# Patient Record
Sex: MALE | Race: WHITE
[De-identification: names, ages, dates, MRNs, and addresses within clinical notes are randomized per-mention and may not be internally consistent; named-entity substitution may affect disease eponyms.]

---

## 2019-07-02 ENCOUNTER — HOSPITAL ENCOUNTER (OUTPATIENT)
Dept: HOSPITAL 53 - M OPP | Age: 79
Discharge: HOME | End: 2019-07-02
Attending: SURGERY
Payer: MEDICARE

## 2019-07-02 VITALS — SYSTOLIC BLOOD PRESSURE: 134 MMHG | DIASTOLIC BLOOD PRESSURE: 84 MMHG

## 2019-07-02 VITALS — HEIGHT: 68 IN | WEIGHT: 179 LBS | BODY MASS INDEX: 27.13 KG/M2

## 2019-07-02 DIAGNOSIS — D12.5: ICD-10-CM

## 2019-07-02 DIAGNOSIS — Z86.010: ICD-10-CM

## 2019-07-02 DIAGNOSIS — Z80.0: ICD-10-CM

## 2019-07-02 DIAGNOSIS — K57.30: ICD-10-CM

## 2019-07-02 DIAGNOSIS — Z12.11: Primary | ICD-10-CM

## 2019-07-02 DIAGNOSIS — D12.2: ICD-10-CM

## 2019-07-02 DIAGNOSIS — D12.3: ICD-10-CM

## 2019-07-02 NOTE — ROOR
________________________________________________________________________________

Patient Name: Espinoza Childers             Procedure Date: 7/2/2019 7:29 AM

MRN: [id number]                          Account Number: [id number]

YOB: 1940                Age: 79

Room: [facility]                            Gender: Male

Note Status: Finalized                 

________________________________________________________________________________

 

Procedure:           Colonoscopy

Indications:         High risk colon cancer surveillance: Personal history of 

                     colonic polyps, Family history of colon cancer in a 

                     first-degree relative, Last colonoscopy: September 2015

Providers:           Nigel Quezada MD

Referring MD:        Johnna Emerson DO

Requesting Provider: 

Medicines:           Monitored Anesthesia Care

Complications:       No immediate complications.

________________________________________________________________________________

Procedure:           Pre-Anesthesia Assessment:

                     - Prior to the procedure, a History and Physical was 

                     performed, and patient medications and allergies were 

                     reviewed. The patient is competent. The risks and 

                     benefits of the procedure and the sedation options and 

                     risks were discussed with the patient. All questions were 

                     answered and informed consent was obtained. Patient 

                     identification and proposed procedure were verified by 

                     the physician, the nurse and the anesthesiologist. Mental 

                     Status Examination: alert and oriented. CV Examination: 

                     regular rate and rhythm. Prophylactic Antibiotics: The 

                     patient does not require prophylactic antibiotics. Prior 

                     Anticoagulants: The patient has taken no previous 

                     anticoagulant or antiplatelet agents. ASA Grade 

                     Assessment: II - A patient with mild systemic disease. 

                     After reviewing the risks and benefits, the patient was 

                     deemed in satisfactory condition to undergo the 

                     procedure. The anesthesia plan was to use monitored 

                     anesthesia care (MAC). Immediately prior to 

                     administration of medications, the patient was 

                     re-assessed for adequacy to receive sedatives. The heart 

                     rate, respiratory rate, oxygen saturations, blood 

                     pressure, adequacy of pulmonary ventilation, and response 

                     to care were monitored throughout the procedure. The 

                     physical status of the patient was re-assessed after the 

                     procedure.

                     The Colonoscope [id number] #[id number] was introduced through 

                     the anus and advanced to the cecum, identified by 

                     appendiceal orifice and ileocecal valve. The colonoscopy 

                     was performed without difficulty. The patient tolerated 

                     the procedure well. The quality of the bowel preparation 

                     was excellent.

                                                                                

Findings:

     The perianal and digital rectal examinations were normal.

     A 3 mm polyp was found in the proximal ascending colon. The polyp was 

     sessile. The polyp was removed with a jumbo cold forceps. Resection and 

     retrieval were complete. Estimated blood loss was minimal.

     A 5 mm polyp was found in the transverse colon. The polyp was 

     semi-pedunculated. The polyp was removed with a cold snare. Resection and 

     retrieval were complete. Estimated blood loss was minimal.

     A 3 mm polyp was found in the sigmoid colon. The polyp was sessile. The 

     polyp was removed with a jumbo cold forceps. Resection and retrieval were 

     complete. Estimated blood loss was minimal.

     Many medium-mouthed diverticula were found in the entire colon.

                                                                                

Impression:          - One 3 mm polyp in the proximal ascending colon, removed 

                     with a jumbo cold forceps. Resected and retrieved.

                     - One 5 mm polyp in the transverse colon, removed with a 

                     cold snare. Resected and retrieved.

                     - One 3 mm polyp in the sigmoid colon, removed with a 

                     jumbo cold forceps. Resected and retrieved.

                     - Diverticulosis in the entire examined colon.

Recommendation:      - Discharge patient to home.

                     - Resume previous diet.

                     - Continue present medications.

                     - Await pathology results.

                     - Return to nurse practitioner as previously scheduled.

                                                                                

 

Nigel Quezada MD

__________________

Nigel Quezada MD

7/2/2019 8:28:37 AM

Electronically signed by Nigel Quezada MD

Number of Addenda: 0

 

Note Initiated On: 7/2/2019 7:29 AM

Estimated Blood Loss:

     Estimated blood loss was minimal.

## 2020-11-04 ENCOUNTER — HOSPITAL ENCOUNTER (OUTPATIENT)
Dept: HOSPITAL 53 - M LAB | Age: 80
End: 2020-11-04
Attending: FAMILY MEDICINE
Payer: MEDICARE

## 2020-11-04 DIAGNOSIS — R10.9: Primary | ICD-10-CM

## 2020-11-04 LAB
BUN SERPL-MCNC: 23 MG/DL (ref 7–18)
CREAT SERPL-MCNC: 1.03 MG/DL (ref 0.7–1.3)
GFR SERPL CREATININE-BSD FRML MDRD: > 60 ML/MIN/{1.73_M2} (ref 35–?)

## 2020-11-04 PROCEDURE — 84520 ASSAY OF UREA NITROGEN: CPT

## 2020-11-04 PROCEDURE — 74177 CT ABD & PELVIS W/CONTRAST: CPT

## 2020-11-04 PROCEDURE — 82565 ASSAY OF CREATININE: CPT

## 2020-11-04 PROCEDURE — 36415 COLL VENOUS BLD VENIPUNCTURE: CPT

## 2020-11-04 NOTE — REP
INDICATION:

ABD BLOATING/PAIN,   LAB 1ST THEN FILE RM FOR CT.



COMPARISON:

None



TECHNIQUE:

100 cc Isovue 370.



FINDINGS:

Basilar fibrotic and or subsegmental atelectatic changes are seen in the lung bases.

There is a small left pleural effusion.  There is no evidence of a pericardial

effusion.



Two focal low densities are seen in the liver.  These are likely cysts.  The spleen

and pancreas are within normal limits.  The adrenal glands are within normal limits.

There are multiple bilateral renal cysts of various sizes.  The largest on the right

measures 8.9 cm and the largest on the left measures 5.6 cm.  The abdominal aorta and

para-aortic regions are within normal limits.



There is moderate ascites.  There is nodular enhancement of the omentum and mesentery.

There is omental caking in the lesser sac particularly the gastrohepatic ligament.

There is no evidence of intestinal obstruction.



Bone window technique throughout the exam shows spinal degenerative changes with left

hip and bilateral sacroiliac joint degenerative changes.  There is a right hip

prosthesis.



IMPRESSION:

1. 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

2. Bilateral renal cysts as described above.

3. Other findings as described above.





<Electronically signed by Tae Elias > 11/04/20 [id number]

## 2020-11-06 ENCOUNTER — HOSPITAL ENCOUNTER (OUTPATIENT)
Dept: HOSPITAL 53 - M LAB | Age: 80
End: 2020-11-06
Attending: FAMILY MEDICINE
Payer: MEDICARE

## 2020-11-06 DIAGNOSIS — R18.8: ICD-10-CM

## 2020-11-06 DIAGNOSIS — C79.9: ICD-10-CM

## 2020-11-06 DIAGNOSIS — D12.6: ICD-10-CM

## 2020-11-06 DIAGNOSIS — K76.89: ICD-10-CM

## 2020-11-06 DIAGNOSIS — R14.0: Primary | ICD-10-CM

## 2020-11-06 LAB
ALBUMIN SERPL BCG-MCNC: 2.6 GM/DL (ref 3.2–5.2)
ALT SERPL W P-5'-P-CCNC: 16 U/L (ref 12–78)
BASOPHILS # BLD AUTO: 0.1 10^3/UL (ref 0–0.2)
BASOPHILS NFR BLD AUTO: 0.5 % (ref 0–1)
BILIRUB SERPL-MCNC: 0.6 MG/DL (ref 0.2–1)
BUN SERPL-MCNC: 22 MG/DL (ref 7–18)
CALCIUM SERPL-MCNC: 9.4 MG/DL (ref 8.8–10.2)
CHLORIDE SERPL-SCNC: 98 MEQ/L (ref 98–107)
CO2 SERPL-SCNC: 33 MEQ/L (ref 21–32)
CREAT SERPL-MCNC: 1.12 MG/DL (ref 0.7–1.3)
EOSINOPHIL # BLD AUTO: 0 10^3/UL (ref 0–0.5)
EOSINOPHIL NFR BLD AUTO: 0.1 % (ref 0–3)
GFR SERPL CREATININE-BSD FRML MDRD: > 60 ML/MIN/{1.73_M2} (ref 35–?)
GLUCOSE SERPL-MCNC: 120 MG/DL (ref 70–100)
HCT VFR BLD AUTO: 46.3 % (ref 42–52)
HGB BLD-MCNC: 15.3 G/DL (ref 13.5–17.5)
LYMPHOCYTES # BLD AUTO: 0.7 10^3/UL (ref 1.5–5)
LYMPHOCYTES NFR BLD AUTO: 5 % (ref 24–44)
MCH RBC QN AUTO: 30.4 PG (ref 27–33)
MCHC RBC AUTO-ENTMCNC: 33 G/DL (ref 32–36.5)
MCV RBC AUTO: 91.9 FL (ref 80–96)
MONOCYTES # BLD AUTO: 1.1 10^3/UL (ref 0–0.8)
MONOCYTES NFR BLD AUTO: 7.9 % (ref 0–5)
NEUTROPHILS # BLD AUTO: 11.6 10^3/UL (ref 1.5–8.5)
NEUTROPHILS NFR BLD AUTO: 85.7 % (ref 36–66)
PLATELET # BLD AUTO: 450 10^3/UL (ref 150–450)
POTASSIUM SERPL-SCNC: 3.5 MEQ/L (ref 3.5–5.1)
PROT SERPL-MCNC: 6.5 GM/DL (ref 6.4–8.2)
RBC # BLD AUTO: 5.04 10^6/UL (ref 4.3–6.1)
SODIUM SERPL-SCNC: 139 MEQ/L (ref 136–145)
WBC # BLD AUTO: 13.5 10^3/UL (ref 4–10)

## 2020-11-12 ENCOUNTER — HOSPITAL ENCOUNTER (OUTPATIENT)
Dept: HOSPITAL 53 - M LABSMTC | Age: 80
End: 2020-11-12
Attending: ANESTHESIOLOGY
Payer: MEDICARE

## 2020-11-12 DIAGNOSIS — Z01.812: Primary | ICD-10-CM

## 2020-11-12 DIAGNOSIS — Z20.828: ICD-10-CM

## 2020-11-16 ENCOUNTER — HOSPITAL ENCOUNTER (OUTPATIENT)
Dept: HOSPITAL 53 - M IRPRO | Age: 80
End: 2020-11-16
Attending: SURGERY
Payer: MEDICARE

## 2020-11-16 VITALS — SYSTOLIC BLOOD PRESSURE: 126 MMHG | DIASTOLIC BLOOD PRESSURE: 74 MMHG

## 2020-11-16 DIAGNOSIS — Z79.01: ICD-10-CM

## 2020-11-16 DIAGNOSIS — R85.69: ICD-10-CM

## 2020-11-16 DIAGNOSIS — R18.8: Primary | ICD-10-CM

## 2020-11-16 LAB
APPEARANCE FLD: (no result)
COLOR PRT: YELLOW
INR PPP: 1.43
PROTHROMBIN TIME: 17.8 SECONDS (ref 12.5–14.3)
SPECIMEN SOURCE FLD: (no result)

## 2020-11-16 NOTE — REP
INDICATION:

OTHER ASCITES  PT HAVING LABS FIRST



COMPARISON:

None.



TECHNIQUE:

The procedure was performed by Terri Ramachandran [facility],  under the direct

supervision of Dr. Downs



The risks and benefits of the procedure were explained to the patient and an informed

consent was obtained both verbally and written.  Directly prior to the start of the

procedure a formal time-out was completed in the procedure room.



The largest pocket of fluid was localized in the left flank using ultrasound guidance.

The skin was prepped and draped in a sterile fashion.  Eleven ML of buffered lidocaine

was used as a local anesthetic. An 8-Greek multi side-hole catheter was inserted

using trocar technique.



FINDINGS:

5300 mL of dark yellow ascites fluid was withdrawn, 500 mL was sent to the lab for

further analysis, of the rest was discarded.



The patient tolerated the procedure well and there were no immediate complications.

After the appropriate amount of monitored convalescence, the patient was discharged

from the department.



IMPRESSION:

Ultrasound-guided paracentesis with removal of 5300 mL of ascites.





<Electronically signed by Terri Ramachandran > 11/16/20 [id number]

<Electronically signed by Humberto Downs > 11/16/20 [id number] Keep your intake of vitamin K regular. The highest amount of vitamin K is found in green and leafy vegetables like broccoli, lettuces, cabbage, and spinach. You can eat these foods but keep the portion size the same. Changes in the amount you eat can affect your PT/INR blood test. Contact your doctor before making any major changes in your diet. Limit your alcohol intake.

## 2020-11-17 ENCOUNTER — HOSPITAL ENCOUNTER (OUTPATIENT)
Dept: HOSPITAL 53 - M OPP | Age: 80
Discharge: HOME | End: 2020-11-17
Attending: SURGERY
Payer: MEDICARE

## 2020-11-17 VITALS — HEIGHT: 69 IN | BODY MASS INDEX: 27.4 KG/M2 | WEIGHT: 185 LBS

## 2020-11-17 VITALS — DIASTOLIC BLOOD PRESSURE: 61 MMHG | SYSTOLIC BLOOD PRESSURE: 100 MMHG

## 2020-11-17 DIAGNOSIS — I10: ICD-10-CM

## 2020-11-17 DIAGNOSIS — R18.8: ICD-10-CM

## 2020-11-17 DIAGNOSIS — K25.9: ICD-10-CM

## 2020-11-17 DIAGNOSIS — R93.3: ICD-10-CM

## 2020-11-17 DIAGNOSIS — K22.2: Primary | ICD-10-CM

## 2020-11-17 DIAGNOSIS — K31.89: ICD-10-CM

## 2020-11-17 DIAGNOSIS — Z87.891: ICD-10-CM

## 2020-11-17 NOTE — ROOR
________________________________________________________________________________

Patient Name: Espinoza Childers             Procedure Date: 11/17/2020 3:27 PM

MRN: [id number]                          Account Number: [id number]

YOB: 1940                Age: 80

Room: [facility]                            Gender: Male

Note Status: Finalized                 

________________________________________________________________________________

 

Procedure:            Upper GI endoscopy

Indications:          Abnormal CT of the GI tract, Patient has new ascites and 

                      CT shows nodular appearance of mesentery and omentum 

                      suggesting malignancy.

Providers:            Nigel Quezada MD

Referring MD:         Johnna Emerson DO

Requesting Provider:  

Medicines:            Monitored Anesthesia Care

Complications:        No immediate complications.

________________________________________________________________________________

Procedure:            Pre-Anesthesia Assessment:

                      - Prior to the procedure, a History and Physical was 

                      performed, and patient medications and allergies were 

                      reviewed. The patient is competent. The risks and 

                      benefits of the procedure and the sedation options and 

                      risks were discussed with the patient. All questions 

                      were answered and informed consent was obtained. Patient 

                      identification and proposed procedure were verified by 

                      the physician, the nurse and the anesthetist in the 

                      procedure room. Mental Status Examination: alert and 

                      oriented. CV Examination: regular rate and rhythm. 

                      Prophylactic Antibiotics: The patient does not require 

                      prophylactic antibiotics. Prior Anticoagulants: The 

                      patient has taken no previous anticoagulant or 

                      antiplatelet agents. ASA Grade Assessment: III - A 

                      patient with severe systemic disease. After reviewing 

                      the risks and benefits, the patient was deemed in 

                      satisfactory condition to undergo the procedure. The 

                      anesthesia plan was to use monitored anesthesia care 

                      (MAC). Immediately prior to administration of 

                      medications, the patient was re-assessed for adequacy to 

                      receive sedatives. The heart rate, respiratory rate, 

                      oxygen saturations, blood pressure, adequacy of 

                      pulmonary ventilation, and response to care were 

                      monitored throughout the procedure. The physical status 

                      of the patient was re-assessed after the procedure.

                      The Endoscope was introduced through the mouth, and 

                      advanced to the second part of duodenum. The upper GI 

                      endoscopy was accomplished without difficulty. The 

                      patient tolerated the procedure well.

                                                                                

Findings:

     One benign-appearing, intrinsic mild stenosis was found at the 

     gastroesophageal junction. The stenosis was traversed.

     Multiple dispersed, small non-bleeding erosions were found in the gastric 

     antrum and in the prepyloric region of the stomach. There were no 

     stigmata of recent bleeding.

     One non-obstructing non-bleeding cratered duodenal ulcer with pigmented 

     material was found in the first portion of the duodenum. The lesion was 

     20 mm in largest dimension. There is no evidence of perforation. Biopsies 

     were taken with a cold forceps for histology.

     The second portion of the duodenum was normal.

                                                                                

Impression:           - Benign-appearing esophageal stenosis.

                      - Non-bleeding erosive gastropathy.

                      - Non-obstructing non-bleeding duodenal ulcer with 

                      pigmented material. There is no evidence of perforation. 

                      Biopsied.

                      - Normal second portion of the duodenum.

Recommendation:       - Discharge patient to home.

                      - Resume previous diet.

                      - Continue present medications.

                      - Use Prilosec (omeprazole) 40 mg PO BID.

                      - Await pathology results.

                      - Return to my office in 1 week.

                                                                                

Procedure Code(s):    --- Professional ---

                      60195, Esophagogastroduodenoscopy, flexible, transoral; 

                      with biopsy, single or multiple

Diagnosis Code(s):    --- Professional ---

                      K22.2, Esophageal obstruction

                      K31.89, Other diseases of stomach and duodenum

                      K26.9, Duodenal ulcer, unspecified as acute or chronic, 

                      without hemorrhage or perforation

                      R93.3, Abnormal findings on diagnostic imaging of other 

                      parts of digestive tract

 

CPT copyright 2019 [employer]. All rights reserved.

 

The codes documented in this report are preliminary and upon  review may 

be revised to meet current compliance requirements.

 

Nigel Quezada MD

__________________

Nigel Quezada MD

11/17/2020 5:20:52 PM

Electronically signed by Nigel Quezada MD

Number of Addenda: 0

 

Note Initiated On: 11/17/2020 3:27 PM

Estimated Blood Loss: Estimated blood loss was minimal.

## 2020-11-19 ENCOUNTER — HOSPITAL ENCOUNTER (INPATIENT)
Dept: HOSPITAL 53 - M ED | Age: 80
LOS: 4 days | Discharge: HOME | DRG: 357 | End: 2020-11-23
Attending: INTERNAL MEDICINE | Admitting: INTERNAL MEDICINE
Payer: MEDICARE

## 2020-11-19 VITALS — BODY MASS INDEX: 28.8 KG/M2 | WEIGHT: 194.45 LBS | HEIGHT: 69 IN

## 2020-11-19 VITALS — DIASTOLIC BLOOD PRESSURE: 80 MMHG | SYSTOLIC BLOOD PRESSURE: 130 MMHG

## 2020-11-19 DIAGNOSIS — Z98.42: ICD-10-CM

## 2020-11-19 DIAGNOSIS — I27.82: ICD-10-CM

## 2020-11-19 DIAGNOSIS — J91.0: ICD-10-CM

## 2020-11-19 DIAGNOSIS — Z79.899: ICD-10-CM

## 2020-11-19 DIAGNOSIS — R18.8: ICD-10-CM

## 2020-11-19 DIAGNOSIS — K56.7: Primary | ICD-10-CM

## 2020-11-19 DIAGNOSIS — Z96.641: ICD-10-CM

## 2020-11-19 DIAGNOSIS — K22.2: ICD-10-CM

## 2020-11-19 DIAGNOSIS — E78.00: ICD-10-CM

## 2020-11-19 DIAGNOSIS — E86.0: ICD-10-CM

## 2020-11-19 DIAGNOSIS — Z79.01: ICD-10-CM

## 2020-11-19 DIAGNOSIS — C78.6: ICD-10-CM

## 2020-11-19 DIAGNOSIS — K31.9: ICD-10-CM

## 2020-11-19 DIAGNOSIS — R00.0: ICD-10-CM

## 2020-11-19 DIAGNOSIS — Z87.891: ICD-10-CM

## 2020-11-19 DIAGNOSIS — Z98.41: ICD-10-CM

## 2020-11-19 DIAGNOSIS — K26.9: ICD-10-CM

## 2020-11-19 DIAGNOSIS — Z66: ICD-10-CM

## 2020-11-19 LAB
ALBUMIN SERPL BCG-MCNC: 1.8 GM/DL (ref 3.2–5.2)
ALT SERPL W P-5'-P-CCNC: 15 U/L (ref 12–78)
APTT BLD: 33.5 SECONDS (ref 24.2–38.5)
BASOPHILS # BLD AUTO: 0 10^3/UL (ref 0–0.2)
BASOPHILS NFR BLD AUTO: 0.2 % (ref 0–1)
BILIRUB CONJ SERPL-MCNC: 0.1 MG/DL (ref 0–0.2)
BILIRUB SERPL-MCNC: 0.3 MG/DL (ref 0.2–1)
BUN SERPL-MCNC: 34 MG/DL (ref 7–18)
CALCIUM SERPL-MCNC: 8 MG/DL (ref 8.8–10.2)
CHLORIDE SERPL-SCNC: 104 MEQ/L (ref 98–107)
CK MB CFR.DF SERPL CALC: 4.29
CK MB SERPL-MCNC: 1.8 NG/ML (ref ?–3.6)
CK SERPL-CCNC: 42 U/L (ref 39–308)
CO2 SERPL-SCNC: 28 MEQ/L (ref 21–32)
CREAT SERPL-MCNC: 1.21 MG/DL (ref 0.7–1.3)
EOSINOPHIL # BLD AUTO: 0 10^3/UL (ref 0–0.5)
EOSINOPHIL NFR BLD AUTO: 0.2 % (ref 0–3)
GFR SERPL CREATININE-BSD FRML MDRD: > 60 ML/MIN/{1.73_M2} (ref 35–?)
GLUCOSE SERPL-MCNC: 104 MG/DL (ref 70–100)
HCT VFR BLD AUTO: 37.6 % (ref 42–52)
HGB BLD-MCNC: 12.4 G/DL (ref 13.5–17.5)
INR PPP: 1.42
LIPASE SERPL-CCNC: 121 U/L (ref 73–393)
LYMPHOCYTES # BLD AUTO: 0.6 10^3/UL (ref 1.5–5)
LYMPHOCYTES NFR BLD AUTO: 5.7 % (ref 24–44)
MCH RBC QN AUTO: 29.5 PG (ref 27–33)
MCHC RBC AUTO-ENTMCNC: 33 G/DL (ref 32–36.5)
MCV RBC AUTO: 89.5 FL (ref 80–96)
MONOCYTES # BLD AUTO: 0.8 10^3/UL (ref 0–0.8)
MONOCYTES NFR BLD AUTO: 8.3 % (ref 0–5)
NEUTROPHILS # BLD AUTO: 8.6 10^3/UL (ref 1.5–8.5)
NEUTROPHILS NFR BLD AUTO: 84.9 % (ref 36–66)
PLATELET # BLD AUTO: 540 10^3/UL (ref 150–450)
POTASSIUM SERPL-SCNC: 4.1 MEQ/L (ref 3.5–5.1)
PROT SERPL-MCNC: 5.6 GM/DL (ref 6.4–8.2)
PROTHROMBIN TIME: 17.7 SECONDS (ref 12.5–14.3)
RBC # BLD AUTO: 4.2 10^6/UL (ref 4.3–6.1)
SODIUM SERPL-SCNC: 139 MEQ/L (ref 136–145)
TROPONIN I SERPL-MCNC: 0.03 NG/ML (ref ?–0.1)
WBC # BLD AUTO: 10.1 10^3/UL (ref 4–10)

## 2020-11-19 RX ADMIN — OMEPRAZOLE SCH MG: 20 CAPSULE, DELAYED RELEASE ORAL at 23:28

## 2020-11-19 RX ADMIN — DEXTROSE AND SODIUM CHLORIDE SCH MLS/HR: 5; 900 INJECTION, SOLUTION INTRAVENOUS at 23:04

## 2020-11-19 RX ADMIN — RAMELTEON SCH MG: 8 TABLET ORAL at 21:00

## 2020-11-19 NOTE — REP
INDICATION:

r/o pneumonia.



COMPARISON:

None.



TECHNIQUE:

CT angiogram chest performed following the intravenous administration of 100 cc of

Isovue 370.  Sagittal and coronal reconstruction images are performed.



FINDINGS:

Lungs: There is patchy atelectasis or infiltrate inferiorly in the left lung.

Mediastinum: There is an enlarged left carinal lymph node measuring 1.2 cm in short

axis dimension.  There are few adjacent subcentimeter mediastinal lymph nodes.  There

are several subcentimeter lymph nodes in the cardiophrenic angles.  There is a

enlarged lymph node the right lateral cardiophrenic angle along the diaphragm which

measures 2.0 x 1.1 cm.  Few subcentimeter lymph nodes are seen anterior to the distal

esophagus.

Pulmonary arteries: In the posterior basilar pulmonary artery segment supplying the

right lower lobe there are partially occlusive pulmonary emboli which are likely not

acute.

Eufemia: No adenopathy.

Axilla: No adenopathy.

Pleura: There is a large left pleural effusion.

Heart: Not enlarged.

Thoracic aorta: No aneurysm or dissection.



Visualized osseous structures: There are degenerative changes of the spine without

compression deformity.



IMPRESSION:

There is patchy atelectasis or infiltrate inferiorly in the left lung.  There is a

mildly enlarged left carinal lymph node.  There are multiple subcentimeter lymph nodes

in the cardiophrenic angles and anterior to the distal esophagus.  There is an

enlarged right lateral cardiophrenic lymph node.



Partially occlusive pulmonary emboli in the secondary branch of the right pulmonary

artery supplying the posterior basilar segment of the right lower lobe.  I suspect

these are most likely chronic.



Large left pleural effusion.





<Electronically signed by Humberto Downs > 11/19/20 5200

## 2020-11-19 NOTE — REP
INDICATION:

gen abd pain.



COMPARISON:

11/04/2020



TECHNIQUE:

100 cc Isovue 370.  No oral bowel preparatory contrast was administered.



FINDINGS:

Please see CT angio chest report made same day in regards to findings involving the

lung bases.



There is a moderate amount of ascites.  There is abnormal soft tissue density and

enhancement involving the omentum consistent with omental caking.  There are tiny

nodular density seen along the peritoneum likely secondary to peritoneal seeding.  The

liver and gallbladder are within normal limits.  Incidental note is made of a small

hepatic cysts and a tiny hepatic calcification.  The spleen is within normal limits.

There are multiple bilateral renal cysts.  The pancreas is unchanged.  Small lymph

nodes are seen in the para-aortic region.  There is no evidence of free

intraperitoneal air.  Multiple gas and fluid-filled bowel loops are seen in the

abdomen.  There is gas and stool in the rectosigmoid region.



There is no significant change in appearance of the osseous structures.



IMPRESSION:

1. There is ascites which has decreased slightly in size compared to the prior exam.

2. There is evidence of peritoneal and omental metastatic disease.

3. There is evidence of an ileus versus partial small bowel obstruction.  This

represents a change from the prior exam.

4. Bilateral renal cysts are again noted.

5. Other findings as described above.





<Electronically signed by Tae Elias > 11/19/20 [id number]

## 2020-11-19 NOTE — HPEPDOC
[facility] Medical History & Physical


Date of Admission


Nov 19, 2020


Date of Service:  Nov 19, 2020


Primary Care Physician:  Johnna Emerson


Attending Physician:  NICKOLAS RICHARDS MD





History and Physical


CHIEF COMPLAINT:


Generalized abdominal pain





HISTORY OF PRESENT ILLNESS: 


Patient is an 80-year-old male, past medical history significant for 

hypertension and recently diagnosed metastatic carcinoma of unknown primary 

source. Patient shares that he was experiencing abdominal pain and distention 

earlier this month. A CT of the abdomen was performed by his primary care 

provider which indicated ascites and the stigmata of metastatic disease. He was 

subsequently referred to general surgery for further evaluation of the ascites. 

Paracentesis on 11/16/20 performed by Dr. Quezada with the removal of 5300 mL of

ascites. Ascitic fluid contained 1051 WBCs with high percentage of mononuclear 

and polymorphonuclear cells. Cytology report indicating atypical cells favoring 

malignancy. Patient reported noting significant improvement in his abdominal 

discomfort following the procedure.





He also recently underwent upper endoscopy on 11/17/20, also performed by Dr. Quezada. Findings indicate benign-appearing is soft  stenosis, nonbleeding 

erosive gastropathy, nonobstructing nonbleeding duodenal ulcer with pigmented 

material without evidence of perforation. Pathology on the pigmented material 

shows duodenal mucosa is extensive ulceration and reparative changes, negative 

for malignancy. A shunt was well in the perioperative period and routinely 

discharged home.





Patient presented to the emergency department the evening of 11/19/20. EMS. 

Patient reported increasing abdominal discomfort and distention that was 

becoming intolerable. He reports this pain is similar to what he experienced 

prior to his paracentesis. He also reported some increasing shortness of breath,

particularly while lying down. Denied any chest pain or tightness.





In the emergency department, patient was found to be afebrile, tachycardic, 

normotensive maintaining saturation 95% on room air. Laboratory evaluation 

showed a WBC of 10.1, H/H of 12.4/37.6. Mild thrombocytosis of 540. Electrolytes

within normal limits, BUN/Cr of 34/1.21. Hypoalbuminemia of 1.8. Troponin of 

0.03 without any signs of ischemia on EKG. Given patient's abdominal pain and 

distention, plain film was ordered showing signs of possible small bowel 

obstruction. Abdomen/pelvis CT confirmed the finding. CT angiography of the 

patient's chest indicates a chronic pulmonary embolism, likely related to 

paraneoplastic hypercoagulability. Emergency department contacted on-call 

surgeon who happened to be Dr. Quezada. He recommended not placing an NG tube, 

keeping the patient nothing by mouth with IVF hydration and monitoring. 





Given the above findings, in the setting of newly diagnosed metastatic disease, 

the hospice team was contacted to admit the patient for further evaluation and 

management.





PAST MEDICAL HISTORY:


Hypertension


Right hip osteoarthritis


Metastatic carcinoma, unknown primary


Esophageal stricture





PAST SURGICAL HISTORY:


Polypectomy, 2009, 2012, 2015


Bilateral cataracts, 2014


Right total hip replacement, 2015


Bilateral carpal tunnel release, 2015





SOCIAL HISTORY:


Marital status: 


Resides in: Lives in [place] with his wife. 


Employment: Retired private-


Tobacco use: Current nonsmoker, patient reports intermittently smoking pipes and

cigars but has not done so for 30 years.


ETOH: Denies any current alcohol use


Illicit drug use: Denies any current illicit or IV drug use





FAMILY HISTORY:


Father: Lung cancer


Mother: Colon cancer


Denies any family history of breast or prostate cancer





ALLERGIES: 


Please see below.





REVIEW OF SYSTEMS:


CONSTITUTIONAL: Denies any fevers, chills. Denies night sweats and fatigue. 

Reports decreased appetite. 


HEENT: Denies any headache, changes in vision, changes in hearing, tinnitus, 

rhinorrhea or nasal congestion, sore throat or difficulty swallowing


CARDIOVASCULAR: Denies any chest pain, pressure, palpitations


RESPIRATORY: Reports intermittent dry cough


GASTROINTESTINAL: Reports generalized periumbilical abdominal fullness and 

discomfort, resolved status post morphine. Reports a one-month history of loose 

stools without any kathryn blood or melena.


GENITOURINARY: Has any difficulty urinating


SKIN: No new rashes or lesions, denies any recent bruising


MUSCULOSKELETAL: Has any muscle aches or pains


NEUROLOGICAL: Denies any focal neurologic deficits, weakness, changes in 

sensorium





HOME MEDICATIONS: 


Please see below. 





PHYSICAL EXAMINATION:


VITAL SIGNS: See below


GENERAL APPEARANCE: Patient is interviewed and examined in the emergency 

department. Patient was found to be resting comfortably on his left side on the 

hospital stretcher. He was easily arousable, awake, alert, no acute distress. He

is able to answer questions regarding his medical history without trouble. 

Patient appears his stated age, good hygiene.


HEENT: Normocephalic, atraumatic, symmetric, EOMI, PERRLA, mucous membranes 

moist, fair oral hygiene


CARDIOVASCULAR: Regular rate and rhythm without appreciable murmur


LUNGS: Lung sounds diminished in the bases bilaterally, more so on the left than

the right. Left-sided basilar crackles. Otherwise good air movement with 

symmetric chest rise. No appreciable wheezing or rhonchi


ABDOMEN: Distended, round, mild tenderness in the lower quadrants bilaterally, 

bowel sounds appreciated. No overlying skin changes


MUSCULOSKELETAL: Patient is able to move all extremities equally bilaterally.


EXTREMITIES: Trace edema in the lower extremity is bilaterally. No calf 

tenderness, no unilateral findings including erythema, swelling, temperature 

changes.


NEUROLOGICAL: Strength 5 out of 5 in upper and lower extremities. No facial 

droop, aphasia or dysarthria. Sensorium intact


PSYCHIATRIC: Mood and affect are appropriate given patient's current medical 

conditions.





LABORATORY DATA: 


See below.





IMAGING: 


Paracentesis U/S (11/16/20): Ultrasound-guided paracentesis with removal of 5300

mL of ascites.


Chest CT Angio (11/19/20): There is patchy atelectasis or infiltrate inferiorly 

in the left lung. There is a mildly enlarged left carinal lymph node. There are 

multiple subcentimeter lymph nodes in the cardiophrenic angles and anterior to 

the distal esophagus. There is an enlarged right lateral cardiophrenic lymph 

node. Partially occlusive pulmonary emboli and a secondary branch of the right 

pulmonary artery supplying the posterior basal segment of the right lower lobe. 

I suspect these are most likely chronic. Large left pleural effusion.


Abdomen/Pelvis CT (11/19/20): There is ascites which has decreased slightly in 

size compared to prior exam. There is evidence of peritoneal and omental 

metastatic disease. There is evidence of an ileus versus partial small bowel 

obstruction. This represented change from the prior exam. Bilateral renal cysts 

are again noted. 


Abdomen XR (11/19/20): Partial small bowel obstruction versus ileus correlate 

clinically. Left pleural effusion and left basilar opacity. Concomitant 

pneumonia/atelectasis. 





ASSESSMENT: 


Patient is an 80-year-old male, past medical history significant for recently 

diagnosed metastatic disease of unknown primary source, presented to the 

emergency department the evening of 11/19/24 increasing abdominal pain and 

discomfort. Earlier in the week, patient underwent paracentesis with removal of 

over 500 mL of fluid. Imaging in the emergency department demonstrated a 

possible small bowel obstruction. Surgery was contacted who recommended 

refraining from NG tube placement, keeping the patient nothing by mouth with 

adequate IVF. Chronic right basilar PE and left-sided pleural effusion also 

noted on CT angiography of the patient's chest. Effusion, ascites and PE are 

most likely related to patient's underlying malignancy. Given these findings, in

the setting of metastatic disease, patient was admitted to the hospitalist 

service for further evaluation and management.





PLAN:


#Chronic PE, right posterior basilar segment, partially occlusive, 


-Denies any history of hemoptysis, dark tarry stools or hematochezia.


-Start patient on Eliquis 10 mg BID for 7 days, 5 mg BID for maintenance 

thereafter. 


-Monitor for bleeding and bruising. 





#Abdominal pain, 2/2 partial SBO, metastatic disease


-Dr. Quezada, who is familiar with the patient, consulted by ED physician. 


-NPO, IVF, KUB in am, NO NG tube per surgery. 


-Morphine 2 mg for pain/discomfort. 


-Consider serial plain films for monitoring


-Day team to consult surgery in am if not resolving. 





#Large left Pleural Effusion


-Multiple enlarged lymph nodes, likely malignant. 


-Remote pulse ox, O2 to maintain saturation above 92%





#Omental/Peritoneal Mets, unknown primary


-Paracentesis on 11/16/20, s/p removal of 


-Atypical cells, WBC of 100,00, likely malignant cytology


-CT today ascites slightly improved. 





#Tachycardia


-Trop negative, EKG RBBB, 104 on assessment. 


-Suspect dehydration/poor oral intake. IVF as above.


-Telemetry monitoring





#Hypertension


-Continue home lisinopril/hctz


-Normotensive





#Esophageal stricture


-S/P endoscopic evaluation on 11/17 by Dr. Quezada. 


-Continue home PPI





#Deconditioning


Consider discussion regarding palliative care








DVT PROPHYLAXIS:


Patient will be receiving Eliquis for his chronic PE





CODE STATUS:


Patient confirms DNR/trial of intubation





DISPOSITION:


Anticipate >2 night stays





Vital Signs





Vital Signs








  Date Time  Temp Pulse Resp B/P (MAP) Pulse Ox O2 Delivery O2 Flow Rate FiO2


 


11/19/20 19:17  104 16  90 Room Air  


 


11/19/20 19:15    119/59 (79)    


 


11/19/20 14:20 99.0       











Laboratory Data


Labs 24H


Laboratory Tests 2


11/19/20 15:16: 


Immature Granulocyte % (Auto) 0.7, Neutrophils (%) (Auto) 84.9H, Lymphocytes (%)

(Auto) 5.7L, Monocytes (%) (Auto) 8.3H, Eosinophils (%) (Auto) 0.2, Basophils 

(%) (Auto) 0.2, Neutrophils # (Auto) 8.6H, Lymphocytes # (Auto) 0.6L, Monocytes 

# (Auto) 0.8, Eosinophils # (Auto) 0.0, Basophils # (Auto) 0.0, Nucleated Red 

Blood Cells % (auto) 0.0, Prothrombin Time 17.7H, Prothromb Time International 

Ratio 1.42, Activated Partial Thromboplast Time 33.5, Anion Gap 7L, Glomerular 

Filtration Rate > 60.0, Calcium Level 8.0L, Total Bilirubin 0.3, Direct 

Bilirubin 0.1, Aspartate Amino Transf (AST/SGOT) 32, Alanine Aminotransferase 

(ALT/SGPT) 15, Alkaline Phosphatase 64, Total Creatine Kinase 42, Creatine Kinas

e MB 1.8, Creatine Kinase MB Relative Index 4.29H, Troponin I 0.03, Total 

Protein 5.6L, Albumin 1.8L, Albumin/Globulin Ratio 0.5, Lipase 121


11/19/20 18:41: Coronavirus (COVID-19)(PCR) NEGATIVE


CBC/BMP


Laboratory Tests


11/19/20 15:16











Home Medications


Scheduled


Apixaban (Eliquis) 5 Mg Tablet, 1 TAB PO BID


Ascorbic Acid (Ascorbic Acid) 500 Mg Tablet, 1,000 MG PO DAILY


Cholecalciferol (Vitamin D3) (Vitamin D3) 1,000 Unit Tablet, 1,000 UNITS PO 

DAILY


Multivitamins (Thera M Plus Tablet) 1 Each Tablet, 1 TAB PO DAILY


Omeprazole (Omeprazole) 40 Mg Capsule.dr, 40 MG PO BID





Scheduled PRN


Acetaminophen (Acetaminophen ER) 650 Mg Tablet.er, 650 MG PO Q8H PRN for PAIN





Allergies


Coded Allergies:  


     No Known Allergies (Unverified , 11/12/20)





A-FIB/CHADSVASC


A-FIB History


Current/History of A-Fib/PAF?:  No





GME ATTESTATION


GME ATTESTATION


My faculty preceptor for this patient encounter was physically present during 

the encounter and was fully available. All aspects of the patient interview, 

examination, medical decision making process, and medical care plan development 

were reviewed and approved by the faculty preceptor. The faculty preceptor is 

aware and concurs with the plan as stated in the body of this note and will 

attest to such by his/her cosignature.





ATTENDING NOTE


TIME OF SERVICE 915PM





Mr. Childers  is an 80 yr old w a hx of HTN, and newly diagnosed metastatic abd CA 

w ascites who presented w abdominal pain and will be admitted for management of 

possible SBO vs ileus. 


Plan: will ask the day time team to consider consider IR consult for therapeutic

paracentesis and pigtail and to f/u with  on possible SBO





rest per 's H&P











CAROLYNN CAI DO                Nov 19, 2020 20:52


NICKOLAS RICHARDS MD                Nov 20, 2020 06:23

## 2020-11-19 NOTE — REP
INDICATION:

abd pain.



COMPARISON:

Two view chest of 02/21/2015 the latest prior



FINDINGS:



Multiple gas-filled dilated small bowel loops are seen in the abdomen. The upright

view shows air-fluid levels within multiple small bowel loops.  There is some gas in

the rectosigmoid region.



The accompanying frontal view the chest shows a new left pleural effusion and patchy

left basilar opacity. There is no evidence of free subdiaphragmatic air.



IMPRESSION:

1. Partial small bowel obstruction versus ileus correlate clinically.

2. Left pleural effusion and left basilar opacity.  Concomitant pneumonia/atelectasis.





<Electronically signed by Tae Elias > 11/19/20 1444

## 2020-11-20 VITALS — SYSTOLIC BLOOD PRESSURE: 106 MMHG | DIASTOLIC BLOOD PRESSURE: 56 MMHG

## 2020-11-20 VITALS — DIASTOLIC BLOOD PRESSURE: 60 MMHG | SYSTOLIC BLOOD PRESSURE: 103 MMHG

## 2020-11-20 VITALS — SYSTOLIC BLOOD PRESSURE: 125 MMHG | DIASTOLIC BLOOD PRESSURE: 67 MMHG

## 2020-11-20 VITALS — SYSTOLIC BLOOD PRESSURE: 98 MMHG | DIASTOLIC BLOOD PRESSURE: 53 MMHG

## 2020-11-20 VITALS — DIASTOLIC BLOOD PRESSURE: 71 MMHG | SYSTOLIC BLOOD PRESSURE: 129 MMHG

## 2020-11-20 LAB
ALBUMIN SERPL BCG-MCNC: 1.5 GM/DL (ref 3.2–5.2)
ALT SERPL W P-5'-P-CCNC: 13 U/L (ref 12–78)
BILIRUB SERPL-MCNC: 0.2 MG/DL (ref 0.2–1)
BUN SERPL-MCNC: 28 MG/DL (ref 7–18)
CALCIUM SERPL-MCNC: 7.6 MG/DL (ref 8.8–10.2)
CANCER AG19-9 SERPL-ACNC: 2.9 U/ML (ref ?–35)
CHLORIDE SERPL-SCNC: 105 MEQ/L (ref 98–107)
CO2 SERPL-SCNC: 31 MEQ/L (ref 21–32)
CREAT SERPL-MCNC: 1.29 MG/DL (ref 0.7–1.3)
GFR SERPL CREATININE-BSD FRML MDRD: 56.8 ML/MIN/{1.73_M2} (ref 35–?)
GLUCOSE SERPL-MCNC: 94 MG/DL (ref 70–100)
MAGNESIUM SERPL-MCNC: 1.3 MG/DL (ref 1.8–2.4)
POTASSIUM SERPL-SCNC: 3.9 MEQ/L (ref 3.5–5.1)
PROT SERPL-MCNC: 5.5 GM/DL (ref 6.4–8.2)
SODIUM SERPL-SCNC: 139 MEQ/L (ref 136–145)

## 2020-11-20 RX ADMIN — MORPHINE SULFATE PRN MG: 2 INJECTION, SOLUTION INTRAMUSCULAR; INTRAVENOUS at 04:34

## 2020-11-20 RX ADMIN — MAGNESIUM SULFATE IN DEXTROSE SCH MLS/HR: 10 INJECTION, SOLUTION INTRAVENOUS at 08:23

## 2020-11-20 RX ADMIN — ASPIRIN SCH MG: 81 TABLET ORAL at 08:26

## 2020-11-20 RX ADMIN — RAMELTEON SCH MG: 8 TABLET ORAL at 21:52

## 2020-11-20 RX ADMIN — MAGNESIUM SULFATE IN DEXTROSE SCH MLS/HR: 10 INJECTION, SOLUTION INTRAVENOUS at 10:06

## 2020-11-20 RX ADMIN — SODIUM CHLORIDE, PRESERVATIVE FREE SCH ML: 5 INJECTION INTRAVENOUS at 21:53

## 2020-11-20 RX ADMIN — ENOXAPARIN SODIUM SCH MG: 80 INJECTION SUBCUTANEOUS at 21:53

## 2020-11-20 RX ADMIN — OMEPRAZOLE SCH MG: 20 CAPSULE, DELAYED RELEASE ORAL at 08:27

## 2020-11-20 RX ADMIN — DEXTROSE AND SODIUM CHLORIDE SCH MLS/HR: 5; 900 INJECTION, SOLUTION INTRAVENOUS at 08:23

## 2020-11-20 RX ADMIN — DEXTROSE MONOHYDRATE SCH MG: 50 INJECTION, SOLUTION INTRAVENOUS at 21:52

## 2020-11-20 NOTE — ECGEPIP
[facility] - ED

                                       

                                       Test Date:    2020

Pat Name:     CAROLINE JARVIS             Department:   

Patient ID:   [id number]                 Room:         [place] [address]

Gender:       Male                     Technician:   VICKI

:          1940               Requested By: REBECA BOYLE

Order Number: [id number]     Reading MD:   Lilliam Aquino

                                 Measurements

Intervals                              Axis          

Rate:         109                      P:            1

NV:           133                      QRS:          14

QRSD:         128                      T:            -27

QT:           347                                    

QTc:          469                                    

                           Interpretive Statements

SINUS TACHYCARDIA

RIGHT BUNDLE BRANCH BLOCK

SIMILAR 20

Electronically Signed on 2020 21:31:40 EST by Lilliam Aquino

## 2020-11-20 NOTE — IPNPDOC
Text Note


Date of Service


The patient was seen on 11/20/20.





NOTE


SUBJECTIVE: Complains of abdominal distension causing some discomfort and di

fficulty in breathing. Poor appetite. No bowel movement. Reports Passing flatus.







VITAL SIGNS: See below


GENERAL APPEARANCE: Laying flat in bed awake, alert, no acute distress. 


HEENT: Normocephalic, atraumatic, symmetric, EOMI, PERRLA, mucous membranes 

moist.


CARDIOVASCULAR: Regular rate and rhythm without appreciable murmur/ rub or 

gallop


LUNGS: Lung sounds diminished in the bases bilaterally, more so on the left than

the right. Left-sided basilar crackles. Otherwise good air movement with sy

mmetric chest rise. No appreciable wheezing or rhonchi


ABDOMEN: Distended, round, mild tenderness in the lower quadrants bilaterally, 

bowel sounds appreciated. No overlying skin changes


MUSCULOSKELETAL: Patient is able to move all extremities equally bilaterally


EXTREMITIES: No edema No calf tenderness


NEUROLOGICAL: Strength 5 out of 5 in upper and lower extremities. No facial 

droop, aphasia or dysarthria. Sensorium intact


PSYCHIATRIC: Mood and affect are appropriate given patient's current medical 

conditions.





LABORATORY DATA and Radiology : Reviewed.





IMAGING: 


Paracentesis U/S (11/16/20): Ultrasound-guided paracentesis with removal of 5300

mL of ascites.


Chest CT Angio (11/19/20): There is patchy atelectasis or infiltrate inferiorly 

in the left lung. There is a mildly enlarged left carinal lymph node. There are 

multiple subcentimeter lymph nodes in the cardiophrenic angles and anterior to 

the distal esophagus. There is an enlarged right lateral cardiophrenic lymph 

node. Partially occlusive pulmonary emboli and a secondary branch of the right 

pulmonary artery supplying the posterior basal segment of the right lower lobe. 

I suspect these are most likely chronic. Large left pleural effusion.


Abdomen/Pelvis CT (11/19/20): There is ascites which has decreased slightly in 

size compared to prior exam. There is evidence of peritoneal and omental 

metastatic disease. There is evidence of an ileus versus partial small bowel 

obstruction. This represented change from the prior exam. Bilateral renal cysts 

are again noted. 


Abdomen XR (11/19/20): Partial small bowel obstruction versus ileus correlate 

clinically. Left pleural effusion and left basilar opacity. Concomitant 

pneumonia/atelectasis. 





ASSESSMENT and Plan: 


Patient is an 80-year-old male, past medical history significant for recently 

diagnosed metastatic disease of unknown primary source with omental caking and 

mesenteric deposits with Ascites. He presented to the emergency department the 

evening of 11/19/24 increasing abdominal pain and discomfort. Earlier in the 

week, patient underwent paracentesis with removal of over 5000 mL of fluid. He 

had an EGD also earlier this which showed benign esophageal stricture, Had 

Colonoscopy in 2019 had multiple tubular adenomas.  Imaging in the emergency 

department demonstrated a possible small bowel obstruction vs Ileus. Surgery was

contacted who recommended refraining from NG tube placement, keeping the patient

nothing by mouth with adequate IVF. CT angio of chest showed chronic right 

basilar Pulmonary Embolism and left-sided pleural effusion moderate. Effusion, 

ascites and PE are most likely related to patient's underlying malignancy. Given

these findings, in the setting of metastatic disease, patient was admitted to 

the hospitalist service for further evaluation and management.





Partial SBO Vs ileus


NPO, IVF, Dr Quezada consulted





Chronic Pulmonary Embolism, right posterior basilar segment, partially 

occlusive, 


will need Eliquis but will hold off as may need procedures this admission


will give Lovenox instead 





Metastatic cancer with Omental/Peritoneal Mets, unknown primary


There were malignant cells in the ascites however immunohistochemistry could not

make a definitive diagnosis from the cells due to the paucity of the cells


Discussed with Dr Mittal will order PSA and Ca 19-9


May need mesenteric lymph node / omental bx to get a tissue diagnosis. 





Hypertension


Lisinopril reduced dose


will top HCTZ. 





Esophageal stenosis 


S/P endoscopic evaluation on 11/17 by Dr. Quezada. 


Biopsy did not show any malignancy


Continue PPI





Duodenal ulcer and erossive gastropathy seen in EGD on 11/17/20


Duodenal mucosa with extensive ulceration and reparative changes.


Negative for malignancy.


PPI





DVT PROPHYLAXIS:


Lovenox





CODE STATUS:


Patient confirms DNR/trial of intubation





VS,Fishbone, I+O


VS, Fishbone, I+O


Laboratory Tests


11/19/20 15:16








11/20/20 04:59











Vital Signs








  Date Time  Temp Pulse Resp B/P (MAP) Pulse Ox O2 Delivery O2 Flow Rate FiO2


 


11/20/20 12:00 98.0 100 18 129/71 (90) 99 Room Air  


 


11/20/20 12:00       2.0 














I&O- Last 24 Hours up to 6 AM 


 


 11/20/20





 05:59


 


Intake Total 50 ml


 


Output Total 350 ml


 


Balance -300 ml

















FLORIAN ROBLES MD                   Nov 20, 2020 13:26

## 2020-11-21 VITALS — DIASTOLIC BLOOD PRESSURE: 63 MMHG | SYSTOLIC BLOOD PRESSURE: 103 MMHG

## 2020-11-21 VITALS — SYSTOLIC BLOOD PRESSURE: 120 MMHG | DIASTOLIC BLOOD PRESSURE: 60 MMHG

## 2020-11-21 VITALS — SYSTOLIC BLOOD PRESSURE: 124 MMHG | DIASTOLIC BLOOD PRESSURE: 70 MMHG

## 2020-11-21 VITALS — SYSTOLIC BLOOD PRESSURE: 109 MMHG | DIASTOLIC BLOOD PRESSURE: 69 MMHG

## 2020-11-21 VITALS — DIASTOLIC BLOOD PRESSURE: 74 MMHG | SYSTOLIC BLOOD PRESSURE: 122 MMHG

## 2020-11-21 VITALS — SYSTOLIC BLOOD PRESSURE: 136 MMHG | DIASTOLIC BLOOD PRESSURE: 73 MMHG

## 2020-11-21 LAB
ALBUMIN SERPL BCG-MCNC: 1.8 GM/DL (ref 3.2–5.2)
ALT SERPL W P-5'-P-CCNC: 15 U/L (ref 12–78)
BASOPHILS # BLD AUTO: 0.1 10^3/UL (ref 0–0.2)
BASOPHILS NFR BLD AUTO: 0.5 % (ref 0–1)
BILIRUB SERPL-MCNC: 0.4 MG/DL (ref 0.2–1)
BUN SERPL-MCNC: 24 MG/DL (ref 7–18)
CALCIUM SERPL-MCNC: 7.6 MG/DL (ref 8.8–10.2)
CHLORIDE SERPL-SCNC: 105 MEQ/L (ref 98–107)
CO2 SERPL-SCNC: 30 MEQ/L (ref 21–32)
CREAT SERPL-MCNC: 1.39 MG/DL (ref 0.7–1.3)
EOSINOPHIL # BLD AUTO: 0.3 10^3/UL (ref 0–0.5)
EOSINOPHIL NFR BLD AUTO: 2.1 % (ref 0–3)
GFR SERPL CREATININE-BSD FRML MDRD: 52.1 ML/MIN/{1.73_M2} (ref 35–?)
GLUCOSE SERPL-MCNC: 91 MG/DL (ref 70–100)
HCT VFR BLD AUTO: 38.2 % (ref 42–52)
HGB BLD-MCNC: 12.1 G/DL (ref 13.5–17.5)
LYMPHOCYTES # BLD AUTO: 1.3 10^3/UL (ref 1.5–5)
LYMPHOCYTES NFR BLD AUTO: 10.9 % (ref 24–44)
MAGNESIUM SERPL-MCNC: 1.7 MG/DL (ref 1.8–2.4)
MCH RBC QN AUTO: 29.4 PG (ref 27–33)
MCHC RBC AUTO-ENTMCNC: 31.7 G/DL (ref 32–36.5)
MCV RBC AUTO: 92.7 FL (ref 80–96)
MONOCYTES # BLD AUTO: 0.9 10^3/UL (ref 0–0.8)
MONOCYTES NFR BLD AUTO: 7.3 % (ref 0–5)
NEUTROPHILS # BLD AUTO: 9.3 10^3/UL (ref 1.5–8.5)
NEUTROPHILS NFR BLD AUTO: 77.9 % (ref 36–66)
PLATELET # BLD AUTO: 526 10^3/UL (ref 150–450)
POTASSIUM SERPL-SCNC: 4.3 MEQ/L (ref 3.5–5.1)
PROT SERPL-MCNC: 5.8 GM/DL (ref 6.4–8.2)
RBC # BLD AUTO: 4.12 10^6/UL (ref 4.3–6.1)
SODIUM SERPL-SCNC: 140 MEQ/L (ref 136–145)
WBC # BLD AUTO: 11.9 10^3/UL (ref 4–10)

## 2020-11-21 RX ADMIN — MORPHINE SULFATE PRN MG: 2 INJECTION, SOLUTION INTRAMUSCULAR; INTRAVENOUS at 14:16

## 2020-11-21 RX ADMIN — SODIUM CHLORIDE, PRESERVATIVE FREE SCH ML: 5 INJECTION INTRAVENOUS at 04:15

## 2020-11-21 RX ADMIN — ENOXAPARIN SODIUM SCH MG: 80 INJECTION SUBCUTANEOUS at 08:32

## 2020-11-21 RX ADMIN — SODIUM CHLORIDE, PRESERVATIVE FREE SCH ML: 5 INJECTION INTRAVENOUS at 14:16

## 2020-11-21 RX ADMIN — DEXTROSE MONOHYDRATE SCH MG: 50 INJECTION, SOLUTION INTRAVENOUS at 20:16

## 2020-11-21 RX ADMIN — DEXTROSE MONOHYDRATE SCH MG: 50 INJECTION, SOLUTION INTRAVENOUS at 08:31

## 2020-11-21 RX ADMIN — MORPHINE SULFATE PRN MG: 2 INJECTION, SOLUTION INTRAMUSCULAR; INTRAVENOUS at 20:17

## 2020-11-21 RX ADMIN — ASPIRIN SCH MG: 81 TABLET ORAL at 08:33

## 2020-11-21 RX ADMIN — MORPHINE SULFATE PRN MG: 2 INJECTION, SOLUTION INTRAMUSCULAR; INTRAVENOUS at 04:15

## 2020-11-21 RX ADMIN — RAMELTEON SCH MG: 8 TABLET ORAL at 20:16

## 2020-11-21 RX ADMIN — SODIUM CHLORIDE, PRESERVATIVE FREE SCH ML: 5 INJECTION INTRAVENOUS at 20:17

## 2020-11-21 NOTE — IPNPDOC
Text Note


Date of Service


The patient was seen on 11/21/20.





NOTE


SUBJECTIVE: No complaints this morning. Some abdominal discomfort from the a

bdominal swelling. 





VITAL SIGNS: See below


GENERAL APPEARANCE: Laying flat in bed awake, alert, no acute distress. 


HEENT: Normocephalic, atraumatic, symmetric, EOMI, PERRLA, mucous membranes 

moist.


CARDIOVASCULAR: Regular rate and rhythm without appreciable murmur/ rub or 

gallop


LUNGS: Lung sounds diminished in the bases bilaterally, more so on the left than

the right. Left-sided basilar crackles. Otherwise good air movement with 

symmetric chest rise. No appreciable wheezing or rhonchi


ABDOMEN: Distended, round, mild tenderness in the lower quadrants bilaterally, 

bowel sounds appreciated. No overlying skin changes


MUSCULOSKELETAL: Patient is able to move all extremities equally bilaterally


EXTREMITIES: No edema No calf tenderness


NEUROLOGICAL: Strength 5 out of 5 in upper and lower extremities. No facial 

droop, aphasia or dysarthria. Sensorium intact


PSYCHIATRIC: Mood and affect are appropriate given patient's current medical 

conditions.





LABORATORY DATA and Radiology : Reviewed.





ASSESSMENT and Plan: 


Patient is an 80-year-old male, past medical history significant for recently di

agnosed metastatic disease of unknown primary source with omental caking and 

mesenteric deposits with Ascites. He presented to the emergency department the 

evening of 11/19/24 increasing abdominal pain and discomfort. Earlier in the 

week, patient underwent paracentesis with removal of over 5000 mL of fluid. He 

had an EGD also earlier this which showed benign esophageal stricture, Had 

Colonoscopy in 2019 had multiple tubular adenomas.  Imaging in the emergency 

department demonstrated a possible small bowel obstruction vs Ileus. Surgery was

contacted who recommended refraining from NG tube placement, keeping the patient

nothing by mouth with adequate IVF. CT angio of chest showed chronic right 

basilar Pulmonary Embolism and left-sided pleural effusion moderate. Effusion, 

ascites and PE are most likely related to patient's underlying malignancy. Given

these findings, in the setting of metastatic disease, patient was admitted to 

the hospitalist service for further evaluation and management.





Partial SBO Vs ileus


clear liquids. 


Advance diet as per surgery. 





Chronic Pulmonary Embolism, right posterior basilar segment, partially 

occlusive, 


will need Eliquis but will hold off as may need procedures this admission


will give Lovenox 





Metastatic cancer with Omental/Peritoneal Mets, unknown primary


There were malignant cells in the ascites however immunohistochemistry could not

make a definitive diagnosis from the cells due to the paucity of the cells


Discussed with Dr Mittal will order PSA and Ca 19-9


May need mesenteric lymph node / omental bx to get a tissue diagnosis. 


Discussed with Dr Quezada , Possible laparoscopic biopsy on Monday. 





Hypertension


Lisinopril reduced dose


will stop HCTZ. 





Esophageal stenosis 


S/P endoscopic evaluation on 11/17 by Dr. Quezada. 


Biopsy did not show any malignancy


Continue PPI





Duodenal ulcer and erossive gastropathy seen in EGD on 11/17/20


Duodenal mucosa with extensive ulceration and reparative changes.


Negative for malignancy.


PPI





DVT PROPHYLAXIS:


Lovenox





CODE STATUS:


Patient confirms DNR/trial of intubation





VS,Fishbone, I+O


VS, Fishbone, I+O


Laboratory Tests


11/21/20 04:16











Vital Signs








  Date Time  Temp Pulse Resp B/P (MAP) Pulse Ox O2 Delivery O2 Flow Rate FiO2


 


11/21/20 08:34    136/74    


 


11/21/20 08:00 97.4 94 18  93 Room Air  


 


11/21/20 04:25       2.0 














I&O- Last 24 Hours up to 6 AM 


 


 11/21/20





 06:00


 


Intake Total 2040 ml


 


Output Total 0 ml


 


Balance 2040 ml

















FLORIAN ROBLES MD                   Nov 21, 2020 11:13

## 2020-11-22 VITALS — DIASTOLIC BLOOD PRESSURE: 60 MMHG | SYSTOLIC BLOOD PRESSURE: 100 MMHG

## 2020-11-22 VITALS — SYSTOLIC BLOOD PRESSURE: 104 MMHG | DIASTOLIC BLOOD PRESSURE: 62 MMHG

## 2020-11-22 VITALS — SYSTOLIC BLOOD PRESSURE: 120 MMHG | DIASTOLIC BLOOD PRESSURE: 67 MMHG

## 2020-11-22 VITALS — SYSTOLIC BLOOD PRESSURE: 115 MMHG | DIASTOLIC BLOOD PRESSURE: 67 MMHG

## 2020-11-22 VITALS — DIASTOLIC BLOOD PRESSURE: 65 MMHG | SYSTOLIC BLOOD PRESSURE: 124 MMHG

## 2020-11-22 VITALS — DIASTOLIC BLOOD PRESSURE: 67 MMHG | SYSTOLIC BLOOD PRESSURE: 114 MMHG

## 2020-11-22 VITALS — DIASTOLIC BLOOD PRESSURE: 68 MMHG | SYSTOLIC BLOOD PRESSURE: 113 MMHG

## 2020-11-22 VITALS — DIASTOLIC BLOOD PRESSURE: 67 MMHG | SYSTOLIC BLOOD PRESSURE: 118 MMHG

## 2020-11-22 VITALS — SYSTOLIC BLOOD PRESSURE: 102 MMHG | DIASTOLIC BLOOD PRESSURE: 60 MMHG

## 2020-11-22 LAB
ALBUMIN SERPL BCG-MCNC: 1.7 GM/DL (ref 3.2–5.2)
ALT SERPL W P-5'-P-CCNC: 13 U/L (ref 12–78)
BASOPHILS # BLD AUTO: 0.1 10^3/UL (ref 0–0.2)
BASOPHILS NFR BLD AUTO: 0.7 % (ref 0–1)
BILIRUB SERPL-MCNC: 0.3 MG/DL (ref 0.2–1)
BUN SERPL-MCNC: 24 MG/DL (ref 7–18)
CALCIUM SERPL-MCNC: 7.5 MG/DL (ref 8.8–10.2)
CHLORIDE SERPL-SCNC: 104 MEQ/L (ref 98–107)
CO2 SERPL-SCNC: 30 MEQ/L (ref 21–32)
CREAT SERPL-MCNC: 1.44 MG/DL (ref 0.7–1.3)
EOSINOPHIL # BLD AUTO: 0.2 10^3/UL (ref 0–0.5)
EOSINOPHIL NFR BLD AUTO: 1.7 % (ref 0–3)
GFR SERPL CREATININE-BSD FRML MDRD: 50 ML/MIN/{1.73_M2} (ref 35–?)
GLUCOSE SERPL-MCNC: 75 MG/DL (ref 70–100)
HCT VFR BLD AUTO: 33.7 % (ref 42–52)
HGB BLD-MCNC: 10.6 G/DL (ref 13.5–17.5)
LYMPHOCYTES # BLD AUTO: 1.3 10^3/UL (ref 1.5–5)
LYMPHOCYTES NFR BLD AUTO: 12.3 % (ref 24–44)
MAGNESIUM SERPL-MCNC: 1.6 MG/DL (ref 1.8–2.4)
MCH RBC QN AUTO: 29.3 PG (ref 27–33)
MCHC RBC AUTO-ENTMCNC: 31.5 G/DL (ref 32–36.5)
MCV RBC AUTO: 93.1 FL (ref 80–96)
MONOCYTES # BLD AUTO: 0.8 10^3/UL (ref 0–0.8)
MONOCYTES NFR BLD AUTO: 8 % (ref 0–5)
NEUTROPHILS # BLD AUTO: 7.9 10^3/UL (ref 1.5–8.5)
NEUTROPHILS NFR BLD AUTO: 76 % (ref 36–66)
PLATELET # BLD AUTO: 490 10^3/UL (ref 150–450)
POTASSIUM SERPL-SCNC: 4.3 MEQ/L (ref 3.5–5.1)
PROT SERPL-MCNC: 4.9 GM/DL (ref 6.4–8.2)
RBC # BLD AUTO: 3.62 10^6/UL (ref 4.3–6.1)
SODIUM SERPL-SCNC: 139 MEQ/L (ref 136–145)
WBC # BLD AUTO: 10.4 10^3/UL (ref 4–10)

## 2020-11-22 PROCEDURE — 0W9G4ZZ DRAINAGE OF PERITONEAL CAVITY, PERCUTANEOUS ENDOSCOPIC APPROACH: ICD-10-PCS | Performed by: SURGERY

## 2020-11-22 PROCEDURE — 0DBW4ZX EXCISION OF PERITONEUM, PERCUTANEOUS ENDOSCOPIC APPROACH, DIAGNOSTIC: ICD-10-PCS | Performed by: SURGERY

## 2020-11-22 RX ADMIN — RAMELTEON SCH MG: 8 TABLET ORAL at 20:16

## 2020-11-22 RX ADMIN — PANTOPRAZOLE SODIUM SCH MG: 40 TABLET, DELAYED RELEASE ORAL at 20:18

## 2020-11-22 RX ADMIN — SODIUM CHLORIDE, PRESERVATIVE FREE SCH ML: 5 INJECTION INTRAVENOUS at 20:19

## 2020-11-22 RX ADMIN — SODIUM CHLORIDE, PRESERVATIVE FREE SCH ML: 5 INJECTION INTRAVENOUS at 13:14

## 2020-11-22 RX ADMIN — DEXTROSE MONOHYDRATE SCH MG: 50 INJECTION, SOLUTION INTRAVENOUS at 08:42

## 2020-11-22 RX ADMIN — ASPIRIN SCH MG: 81 TABLET ORAL at 08:37

## 2020-11-22 RX ADMIN — SODIUM CHLORIDE, PRESERVATIVE FREE SCH ML: 5 INJECTION INTRAVENOUS at 05:35

## 2020-11-22 NOTE — IPNPDOC
Text Note


Date of Service


The patient was seen on 11/22/20.





NOTE


SUBJECTIVE: No complaints this morning. Had good night's sleep. Abdominal di

scomfort has resolved. Planned for laparoscopic biopsy today. Had a diarrhea 

like bowel movement yesterday. 





VITAL SIGNS: See below


GENERAL APPEARANCE: Laying flat in bed awake, alert, no acute distress. 


HEENT: Normocephalic, atraumatic, symmetric, EOMI, PERRLA, mucous membranes 

moist.


CARDIOVASCULAR: Regular rate and rhythm without appreciable murmur/ rub or 

gallop


LUNGS: Lung sounds diminished in the bases bilaterally, more so on the left than

the right. Left-sided basilar crackles. Otherwise good air movement with 

symmetric chest rise. No appreciable wheezing or rhonchi


ABDOMEN: Distended, round, mild tenderness in the lower quadrants bilaterally, 

good bowel sounds appreciated. No overlying skin changes


MUSCULOSKELETAL: Patient is able to move all extremities equally bilaterally


EXTREMITIES: No edema No calf tenderness


NEUROLOGICAL: Strength 5 out of 5 in upper and lower extremities. No facial 

droop, aphasia or dysarthria. Sensorium intact


PSYCHIATRIC: Mood and affect are appropriate given patient's current medical 

conditions.





LABORATORY DATA and Radiology : Reviewed.





ASSESSMENT and Plan: 


Patient is an 80-year-old male, past medical history significant for recently 

diagnosed metastatic disease of unknown primary source with omental caking and 

mesenteric deposits with Ascites. He presented to the emergency department the 

evening of 11/19/24 increasing abdominal pain and discomfort. Earlier in the 

week, patient underwent paracentesis with removal of over 5000 mL of fluid. He 

had an EGD also earlier this which showed benign esophageal stricture, Had 

Colonoscopy in 2019 had multiple tubular adenomas.  Imaging in the emergency 

department demonstrated a possible small bowel obstruction vs Ileus. Surgery was

contacted who recommended refraining from NG tube placement, keeping the patient

nothing by mouth with adequate IVF. CT angio of chest showed chronic right basil

ar Pulmonary Embolism and left-sided pleural effusion moderate. Effusion, 

ascites and PE are most likely related to patient's underlying malignancy. Given

these findings, in the setting of metastatic disease, patient was admitted to 

the hospitalist service for further evaluation and management.





Partial SBO Vs ileus


seems to have resolved at this time


Advance diet as per surgery. 


Going to OR today so is NPO now. 





Chronic Pulmonary Embolism, right posterior basilar segment, partially 

occlusive, 


will give Lovenox 





Metastatic cancer with Omental/Peritoneal Mets, unknown primary


There were malignant cells in the ascites however immunohistochemistry could not

make a definitive diagnosis from the cells due to the paucity of the cells


Discussed with Dr Mittal will order PSA and Ca 19-9


May need mesenteric lymph node / omental bx to get a tissue diagnosis. 


Laparoscopic Biopsy





Hypertension


Lisinopril reduced dose


will stop HCTZ. 





Esophageal stenosis 


S/P endoscopic evaluation on 11/17 by Dr. Quezada. 


Biopsy did not show any malignancy


Continue PPI





Duodenal ulcer and erossive gastropathy seen in EGD on 11/17/20


Duodenal mucosa with extensive ulceration and reparative changes.


Negative for malignancy.


PPI





DVT PROPHYLAXIS:


Lovenox





CODE STATUS:


Patient confirms DNR/trial of intubation





VS,Fishbone, I+O


VS, Fishbone, I+O


Laboratory Tests


11/22/20 05:49











Vital Signs








  Date Time  Temp Pulse Resp B/P (MAP) Pulse Ox O2 Delivery O2 Flow Rate FiO2


 


11/22/20 06:00 98.0 106 21 124/65 (84) 96 Room Air  


 


11/21/20 14:26       2.0 














I&O- Last 24 Hours up to 6 AM 


 


 11/22/20





 06:00


 


Intake Total 1200 ml


 


Output Total 0 ml


 


Balance 1200 ml

















FLORIAN ROBLES MD                   Nov 22, 2020 08:46

## 2020-11-23 VITALS — DIASTOLIC BLOOD PRESSURE: 60 MMHG | SYSTOLIC BLOOD PRESSURE: 100 MMHG

## 2020-11-23 VITALS — SYSTOLIC BLOOD PRESSURE: 111 MMHG | DIASTOLIC BLOOD PRESSURE: 68 MMHG

## 2020-11-23 VITALS — DIASTOLIC BLOOD PRESSURE: 64 MMHG | SYSTOLIC BLOOD PRESSURE: 106 MMHG

## 2020-11-23 VITALS — DIASTOLIC BLOOD PRESSURE: 61 MMHG | SYSTOLIC BLOOD PRESSURE: 103 MMHG

## 2020-11-23 LAB
BASOPHILS # BLD AUTO: 0 10^3/UL (ref 0–0.2)
BASOPHILS NFR BLD AUTO: 0.3 % (ref 0–1)
BUN SERPL-MCNC: 29 MG/DL (ref 7–18)
CALCIUM SERPL-MCNC: 7.4 MG/DL (ref 8.8–10.2)
CHLORIDE SERPL-SCNC: 106 MEQ/L (ref 98–107)
CO2 SERPL-SCNC: 28 MEQ/L (ref 21–32)
CREAT SERPL-MCNC: 1.61 MG/DL (ref 0.7–1.3)
EOSINOPHIL # BLD AUTO: 0.2 10^3/UL (ref 0–0.5)
EOSINOPHIL NFR BLD AUTO: 1.1 % (ref 0–3)
GFR SERPL CREATININE-BSD FRML MDRD: 44 ML/MIN/{1.73_M2} (ref 35–?)
GLUCOSE SERPL-MCNC: 89 MG/DL (ref 70–100)
HCT VFR BLD AUTO: 35 % (ref 42–52)
HGB BLD-MCNC: 11.3 G/DL (ref 13.5–17.5)
LYMPHOCYTES # BLD AUTO: 1 10^3/UL (ref 1.5–5)
LYMPHOCYTES NFR BLD AUTO: 7.8 % (ref 24–44)
MCH RBC QN AUTO: 30.1 PG (ref 27–33)
MCHC RBC AUTO-ENTMCNC: 32.3 G/DL (ref 32–36.5)
MCV RBC AUTO: 93.1 FL (ref 80–96)
MONOCYTES # BLD AUTO: 1.2 10^3/UL (ref 0–0.8)
MONOCYTES NFR BLD AUTO: 8.8 % (ref 0–5)
NEUTROPHILS # BLD AUTO: 10.8 10^3/UL (ref 1.5–8.5)
NEUTROPHILS NFR BLD AUTO: 80.9 % (ref 36–66)
PLATELET # BLD AUTO: 452 10^3/UL (ref 150–450)
POTASSIUM SERPL-SCNC: 4.2 MEQ/L (ref 3.5–5.1)
RBC # BLD AUTO: 3.76 10^6/UL (ref 4.3–6.1)
SODIUM SERPL-SCNC: 138 MEQ/L (ref 136–145)
WBC # BLD AUTO: 13.3 10^3/UL (ref 4–10)

## 2020-11-23 RX ADMIN — PANTOPRAZOLE SODIUM SCH MG: 40 TABLET, DELAYED RELEASE ORAL at 10:27

## 2020-11-23 RX ADMIN — SODIUM CHLORIDE, PRESERVATIVE FREE SCH ML: 5 INJECTION INTRAVENOUS at 05:47

## 2020-11-23 RX ADMIN — ASPIRIN SCH MG: 81 TABLET ORAL at 10:27

## 2020-11-23 NOTE — RO
DATE OF OPERATION:  11/22/2020



PREOPERATIVE DIAGNOSIS:  Ascites and abdominal malignancy. 



POSTOPERATIVE DIAGNOSIS:  Ascites with extensive carcinomatosis 



PROCEDURE PERFORMED:  Laparoscopy with biopsy of peritoneal tumor nodules. 



SURGEON: Nigel Quezada MD



ANESTHESIA: General.



INDICATIONS FOR THE PROCEDURE:  The patient is an 80-year-old man who has over 
the last month to two months developed significant abdominal distension, 
diagnosed as ascites.  A CT scan showed ascites with some nodularity of the 
omentum suggesting metastatic disease.  He had undergone a diagnostic 
paracentesis last week that suggested some malignant cells, but tissue typing 
was not possible based on the limited specimen.  An upper endoscopy showed a 
large duodenal ulcer, but biopsies were benign.  He was admitted for abdominal 
discomfort likely associated with his ascites and possible tumor.  He is now for
a diagnostic laparoscopy with biopsy.  



OPERATIVE PROCEDURE:  The patient was brought to the operating and placed on the
table in a supine position. He was placed under general endotracheal anesthesia.
 The patient's abdomen was prepped and draped in a sterile fashion.  25% 
Marcaine was infiltrate at the trocar sites as needed.  I selected a point in 
the right upper quadrant for placement of an initial incision.  Veress needle 
was inserted and after a positive hanging drop test, I actually was able to 
aspirate some light yellow ascites fluid.  The abdomen was then insufflated with
carbon dioxide gas. After insufflating the abdomen, a 5 mm port was placed over 
a 5 mm scope and advanced through the abdominal wall without difficulty.  The 
scope was inserted.  Initially examination showed innumerable nodules over all 
surfaces that were seen.  The omentum filled the upper abdomen and was partially
adherent to the anterior abdominal wall superiorly.  There were innumerable 
little nodules and thickening of the omentum throughout. Looking at the anterior
abdominal wall, there were innumerable small nodules ranging from as small as 1 
to 2 mm up to perhaps 8 to 10 mm, covering all surfaces.  There were several 
loops of small bowel that were visible inferiorly and the bowel wall appeared 
relatively spared from tumor, but there was some portions of fat coming off the 
sigmoid colon that were also involved by multiple small nodules. A second 5 mm 
port was placed slightly further down in the abdomen under direct vision.  A 
biopsy forceps was inserted and bite biopsies were obtained from multiple small 
nodules on the anterior abdominal wall.  Hemostasis was ensured with the 
cautery.  Final inspection showed no evidence of any ongoing bleeding.  I did 
use a suction  to remove approximately 2400 ml of ascites from the 
abdomen.  The patients abdomen was then deflated and the trocars were removed. 
The skin incisions were closed with buried Vicryl sutures and I then placed two 
3-0 nylon sutures across each of his incisions to close the skin with some 
subcutaneous tissues to try to reduce the risk of an ascites fluid leak.  Light 
dressings were applied followed by OpSites.  The patient tolerated the procedure
well without apparent complication.  He was awakened in the operating room, 
extubated and moved to the recovery room in stable condition.

LOU

## 2020-11-23 NOTE — CR
DATE OF CONSULTATION:  11/20/2020



REASON FOR CONSULTATION:  Possible carcinomatosis.  



HISTORY OF PRESENT ILLNESS:  The patient is a pleasant 80-year-old man that I 
had seen in the office on the November 9 on referral from Dr. Johnna Emerson. He 
reports that he had developed some abdominal distension over about the previous 
five weeks.  He describes some fullness and discomfort with occasional more 
severe pains.  He had increasing pain when he would eat.  Reported that his 
stool has become somewhat dark.  He had seen Dr. Emerson for this and underwent a
CT scan of the abdomen and pelvis on the November 4 that showed a large volume 
of ascites throughout the abdomen.  The radiologist reported nodular enhancement
of the omentum and mesentery with a suggestion of omental caking, suggestive 
of metastatic disease.  After I saw him in the office, he was scheduled for 
diagnostic paracentesis and upper endoscopy.  The paracentesis was performed on 
the November 16.  At the paracentesis, he had 5300 cc of dark yellow fluid 
withdrawn.  500 cc of this was sent to the lab for analysis. He subsequently had
an esophagogastroduodenoscopy (EGD) on the November 17. This suggested a mild 
stenosis at the GE junction. He had a few small erosions in the gastric antrum 
and was noted to have a large cratered duodenal ulcer about 20 mm in diameter.  
Biopsies were obtained. The biopsy from his duodenal ulcer showed mucosa with 
extensive ulceration and reparative changes, but no evidence of malignancy.  His
cytology from his peritoneal fluid showed atypical cells and the pathology from 
the cell block again showed atypical cells suspicious for malignancy, but it was
impossible to obtain a definitive site of origin.  The patient presented to the 
emergency department on the November 19 complaining of increasing abdominal 
discomfort and distension that he felt he could not tolerate any longer.  He 
reported that his abdominal distension seemed to have recurred.  He was admitted
and I am now consulted to evaluate the patient.  



ALLERGIES:  The patient denies any known drug allergies



MEDICATIONS:  Prior to admission include atorvastatin, low-dose aspirin, vitamin
tab with lisinopril and vitamin C.  



SURGICAL HISTORY:  Includes repair of a hernia.  He has had a right hip 
replacement.  He has had an appendectomy. He has had right and left wrist 
surgery.  He has had prior colonoscopy on several occasions.  



MEDICAL HISTORY:  Significant for hypertension and hypercholesterolemia. He has 
a history of osteoarthritis.  



SOCIAL HISTORY:  The patient is  and lives in [place].  He is a former 
part-time smoker. He denies any current alcohol use. 



FAMILY HISTORY:  Significant for lung cancer in his father and colon cancer in 
his mother.



REVIEW OF SYSTEMS:  Reveals no fevers or chills.  He denies chest pain or 
palpitations.  He has had no shortness of breath, cough or wheezing. He is 
having on dysuria or hematuria. On admission, he had a CT of the chest, as a CT 
angiogram that suggested the possibility of a limited pulmonary embolus in the 
right lower lobe I believe it was.



PHYSICAL EXAMINATION:  The patient is very pleasant, older gentleman lying 
quietly on the hospital bed.  Skin is warm and dry.  He is alert and oriented.  
Neck is supple.  Heart examination shows a regular rate and rhythm.  Lungs are 
clear bilaterally.  The abdomen is protuberant with bowel sounds present. The 
abdomen is soft and there does appear to be a fluid wave.  There is no discrete 
point tenderness. 



LABORATORY DATA:  On admission, showed a white count of 10,000, hemoglobin 12, 
hematocrit of 38 and a platelet count of 540,000.  Differential count showed 85%
neutrophils, 6% lymphocytes and 8% monocytes.  Chemistry from the 19th showed 
normal electrolytes.  BUN 34, creatinine 1.2 and a glucose of 104.  Troponin was
0.03.  Total protein 5.6 and albumin of 1.8.  



IMPRESSION:  The patient is a pleasant 80-year-old man with ascites of fairly 
recent onset and a CT scan suggesting probable metastatic disease of unclear 
origin.  His upper endoscopy showed no evidence of cancer origin, but he did 
have an active large duodenal ulcer. His previous diagnostic paracentesis showed
abnormal cytology with cells that appeared malignant, but there was not enough 
cellular material for a definitive diagnosis.  



PLAN:  I discussed with the patient the option of proceeding with a laparoscopy 
and biopsy.  I advised him that this may not change his clinical course.  Any 
sort of chemotherapy would have to be adjusted to the tissue of origin of his 
metastatic disease.  If we identify a type of cancer that can be responsive to 
chemotherapy, then the procedure may be beneficial.  If we find something that 
is not truly amenable to chemotherapy, then the procedure will not be helpful to
him. We discussed this and he had an opportunity to ask questions.  He would 
like to have the biopsy done to better understand what his options may be.  I 
will, therefore, look for a time to add him to the schedule.  He did receive a 
dose of Eliquis and we will need to wait for this to have the time to get out of
his system.  Hopefully, we can accomplish this biopsy over the weekend.  

MTDD

## 2020-11-23 NOTE — CR
DATE OF CONSULTATION:  11/20/2020



REASON FOR CONSULTATION:  Questionable ileus on CAT scan.



BRIEF HISTORY OF PRESENT ILLNESS:  The patient is an 80-year-old male, who has 
been diagnosed with ascites of undetermined etiology with probable 
carcinomatosis and essentially his pathology on the ascites fluid reveals 
malignancy; all consistent with his malignant ascites. There is some slight 
elevation of white count in the fluid and the concern was that he might have 
spontaneous bacterial peritonitis. His decreased p.o. intake is his major issue 
and comes to the Emergency Room for additional treatment and evaluation. He has 
had some shortness of breath while lying down. He has noticed progressive 
distention and pulling across all of his lower abdomen. He has had no true 
nausea or vomiting, mostly just a fullness feeling is his major complaint. 



PAST MEDICAL HISTORY:  Significant for hypertension, osteoarthritis, metastatic 
carcinoma and no primary esophageal stricture. 



PAST SURGICAL HISTORY:  History of colonoscopies with polypectomies, history of 
right hip replacement, carpal tunnels.



PHYSICAL EXAMINATION:  

GENERAL: An 80-year-old male who looks stated age.

HEENT: Unremarkable. 

NECK: Supple without adenopathy.

LUNGS: Diminished on the left hand side significantly.

HEART: Regular.

ABDOMEN: Distended with a fluid wave, nontender. No guarding. No rebound. No 
peritoneal signs are appreciated.



IMPRESSION/PLAN:  Patient is here for a questionable ileus, but he has had some 
flatus and decreased p.o. intake. I anticipate it is the ascites and 
carcinomatosis that is causing the decreased GI function more so, I do not seen 
any evidence of obstruction at this time and unfortunately at this point is a 
very poor outcome. The likelihood is that this is a terminal process with this 
individual and at this point supportive care is reasonable, however, also 
comfort care maybe recommended for this individual, however from a surgical 
standpoint, no surgical intervention is necessary or indicated and I would 
recommend starting him on a clear liquid diet, add some Ensure given his 
anasarca, ascites and decreased protein levels and advance his diet as tolerated
starting tomorrow. Once again no surgical intervention at this time. I have 
discussed the concerns that this is a terminal process with the individual and 
that possibly oncology might make additional recommendations for possible 
treatment, however, I feel that this is very advanced disease at this time and 
with his weight loss and overall decline of function, I feel his functional 
status has substantially decreased as well. In any case, Dr. Quezada is here 
during the weekend should any questions develop, but otherwise surgery will sign
off.

[facility]

## 2020-11-25 ENCOUNTER — HOSPITAL ENCOUNTER (EMERGENCY)
Dept: HOSPITAL 53 - M ED | Age: 80
Discharge: HOME | End: 2020-11-25
Payer: MEDICARE

## 2020-11-25 VITALS — SYSTOLIC BLOOD PRESSURE: 116 MMHG | DIASTOLIC BLOOD PRESSURE: 72 MMHG

## 2020-11-25 VITALS — WEIGHT: 183.38 LBS | HEIGHT: 69 IN | BODY MASS INDEX: 27.16 KG/M2

## 2020-11-25 DIAGNOSIS — Z87.891: ICD-10-CM

## 2020-11-25 DIAGNOSIS — I10: ICD-10-CM

## 2020-11-25 DIAGNOSIS — Z79.01: ICD-10-CM

## 2020-11-25 DIAGNOSIS — Z79.899: ICD-10-CM

## 2020-11-25 DIAGNOSIS — C79.9: ICD-10-CM

## 2020-11-25 DIAGNOSIS — R18.8: Primary | ICD-10-CM

## 2020-11-25 LAB
ALBUMIN SERPL BCG-MCNC: 1.7 GM/DL (ref 3.2–5.2)
ALT SERPL W P-5'-P-CCNC: 13 U/L (ref 12–78)
BASOPHILS # BLD AUTO: 0.1 10^3/UL (ref 0–0.2)
BASOPHILS NFR BLD AUTO: 0.4 % (ref 0–1)
BILIRUB CONJ SERPL-MCNC: < 0.1 MG/DL (ref 0–0.2)
BILIRUB SERPL-MCNC: 0.3 MG/DL (ref 0.2–1)
BUN SERPL-MCNC: 26 MG/DL (ref 7–18)
CALCIUM SERPL-MCNC: 7.6 MG/DL (ref 8.8–10.2)
CHLORIDE SERPL-SCNC: 103 MEQ/L (ref 98–107)
CO2 SERPL-SCNC: 28 MEQ/L (ref 21–32)
CREAT SERPL-MCNC: 1.44 MG/DL (ref 0.7–1.3)
EOSINOPHIL # BLD AUTO: 0 10^3/UL (ref 0–0.5)
EOSINOPHIL NFR BLD AUTO: 0 % (ref 0–3)
GFR SERPL CREATININE-BSD FRML MDRD: 50 ML/MIN/{1.73_M2} (ref 35–?)
GLUCOSE SERPL-MCNC: 98 MG/DL (ref 70–100)
HCT VFR BLD AUTO: 39.1 % (ref 42–52)
HGB BLD-MCNC: 12.6 G/DL (ref 13.5–17.5)
LIPASE SERPL-CCNC: 424 U/L (ref 73–393)
LYMPHOCYTES # BLD AUTO: 0.8 10^3/UL (ref 1.5–5)
LYMPHOCYTES NFR BLD AUTO: 5.2 % (ref 24–44)
MCH RBC QN AUTO: 28.9 PG (ref 27–33)
MCHC RBC AUTO-ENTMCNC: 32.2 G/DL (ref 32–36.5)
MCV RBC AUTO: 89.7 FL (ref 80–96)
MONOCYTES # BLD AUTO: 1 10^3/UL (ref 0–0.8)
MONOCYTES NFR BLD AUTO: 6.3 % (ref 0–5)
NEUTROPHILS # BLD AUTO: 13.7 10^3/UL (ref 1.5–8.5)
NEUTROPHILS NFR BLD AUTO: 87 % (ref 36–66)
PLATELET # BLD AUTO: 533 10^3/UL (ref 150–450)
POTASSIUM SERPL-SCNC: 4.7 MEQ/L (ref 3.5–5.1)
PROT SERPL-MCNC: 5.5 GM/DL (ref 6.4–8.2)
RBC # BLD AUTO: 4.36 10^6/UL (ref 4.3–6.1)
SODIUM SERPL-SCNC: 138 MEQ/L (ref 136–145)
WBC # BLD AUTO: 15.7 10^3/UL (ref 4–10)

## 2020-11-25 PROCEDURE — 85025 COMPLETE CBC W/AUTO DIFF WBC: CPT

## 2020-11-25 PROCEDURE — 99284 EMERGENCY DEPT VISIT MOD MDM: CPT

## 2020-11-25 PROCEDURE — 96374 THER/PROPH/DIAG INJ IV PUSH: CPT

## 2020-11-25 PROCEDURE — 83690 ASSAY OF LIPASE: CPT

## 2020-11-25 PROCEDURE — 74176 CT ABD & PELVIS W/O CONTRAST: CPT

## 2020-11-25 PROCEDURE — 80076 HEPATIC FUNCTION PANEL: CPT

## 2020-11-25 PROCEDURE — 96375 TX/PRO/DX INJ NEW DRUG ADDON: CPT

## 2020-11-25 PROCEDURE — 80048 BASIC METABOLIC PNL TOTAL CA: CPT

## 2020-11-25 NOTE — REP
INDICATION:

worsening abdominal pain



COMPARISON:

11/19/2020



TECHNIQUE:

Axial noncontrast images from the lung bases to the pubic symphysis with coronal and

sagittal reformations.



This CT examination was performed using the following dose reduction techniques:

Automated exposure control, adjustment of mA and/or kv according to the patient's

size, and use of iterative reconstruction technique.



FINDINGS:

Large left pleural effusion with partial left lower lobe collapse and lingular

atelectasis is again identified and similar to prior examination.



Moderate amount of trapped fluid in the lesser sac is identified along with small

amount of residual abdominal fluid extending into the pelvis which is considerably

improved when compared to prior examination.  Extensive omental caking consistent with

malignancy and metastatic disease is identified.



Liver, spleen, pancreas, gallbladder, bilateral adrenal glands are grossly normal

although evaluation is limited by lack of contrast and significant respiratory motion

artifact.  The kidneys demonstrate relatively stable bilateral cysts without acute

perinephric stranding or hydronephrosis.  There is no evidence for bowel obstruction

although pathologic process involving the enteric system cannot be excluded.

Scattered diverticulosis noted.



Evaluation of the pelvis is limited by streak artifact from right hip prosthesis and

the bladder and prostate/seminal vesicles are incompletely evaluated.  No obvious

retroperitoneal adenopathy.  Atherosclerotic changes to the aorta and vasculature

noted without aneurysm.  Musculoskeletal structures without acute osseous abnormality.



Subcutaneous edema primarily noted along the anterior abdomen and right flank have

increased from prior examination.



IMPRESSION:

1.  In comparison with prior examination there appears to be decreased ascites.

Moderate amount of trapped fluid identified in the lesser sac.  Extensive metastatic

disease including significant omental caking again noted.

2.  Continued large left pleural effusion with associated left lower lobe

collapse/atelectasis.

3.  Further nonacute findings as described above.





<Electronically signed by Teddy Ellis > 11/25/20 [id number]

## 2020-11-27 NOTE — ED PDOC
Post-Departure Follow-Up


dr dia faxed formal report of ct abd/p for fu mlg Lundborg-Gray,Maja MD          Nov 27, 2020 08:01

## 2020-12-08 ENCOUNTER — HOSPITAL ENCOUNTER (OUTPATIENT)
Dept: HOSPITAL 53 - M IRPRO | Age: 80
End: 2020-12-08
Attending: INTERNAL MEDICINE
Payer: MEDICARE

## 2020-12-08 VITALS — DIASTOLIC BLOOD PRESSURE: 68 MMHG | SYSTOLIC BLOOD PRESSURE: 118 MMHG

## 2020-12-08 DIAGNOSIS — R18.0: Primary | ICD-10-CM

## 2020-12-08 NOTE — REP
INDICATION:

ASCITES



COMPARISON:

None.



TECHNIQUE:

The procedure was performed by LILLIAN Quinn,  under the direct

supervision of Dr. Braxton



The risks and benefits of the procedure were explained to the patient and an informed

consent was obtained both verbally and written.  Directly prior to the start of the

procedure a formal time-out was completed in the procedure room.



The largest pocket of fluid was localized in the left lower quadrant using ultrasound

guidance.  The skin was prepped and draped in a sterile fashion.  Eleven ML of

buffered lidocaine was used as a local anesthetic. An 8-Slovenian multi side-hole

catheter was inserted using trocar technique.



FINDINGS:

3000 mL of light red tinged fluid was removed in total, 1400 mL were sent to the lab

for further analysis.



The patient tolerated the procedure well and there were no immediate complications.

After the appropriate amount of monitored convalescence, the patient was discharged

from the department.



IMPRESSION:

Ultrasound-guided paracentesis with removal of 3000 mL light red tinge ascites.





<Electronically signed by Terri Ramachandran > 12/08/20 1312

<Electronically signed by Alex Braxton > 12/08/20 1345

## 2020-12-14 ENCOUNTER — HOSPITAL ENCOUNTER (INPATIENT)
Dept: HOSPITAL 53 - M ED | Age: 80
LOS: 8 days | DRG: 682 | End: 2020-12-22
Attending: INTERNAL MEDICINE | Admitting: FAMILY MEDICINE
Payer: MEDICARE

## 2020-12-14 VITALS — SYSTOLIC BLOOD PRESSURE: 71 MMHG | DIASTOLIC BLOOD PRESSURE: 54 MMHG

## 2020-12-14 VITALS — DIASTOLIC BLOOD PRESSURE: 78 MMHG | SYSTOLIC BLOOD PRESSURE: 125 MMHG

## 2020-12-14 VITALS — SYSTOLIC BLOOD PRESSURE: 80 MMHG | DIASTOLIC BLOOD PRESSURE: 50 MMHG

## 2020-12-14 VITALS — SYSTOLIC BLOOD PRESSURE: 107 MMHG | DIASTOLIC BLOOD PRESSURE: 63 MMHG

## 2020-12-14 VITALS — SYSTOLIC BLOOD PRESSURE: 92 MMHG | DIASTOLIC BLOOD PRESSURE: 56 MMHG

## 2020-12-14 VITALS — DIASTOLIC BLOOD PRESSURE: 57 MMHG | SYSTOLIC BLOOD PRESSURE: 98 MMHG

## 2020-12-14 VITALS — SYSTOLIC BLOOD PRESSURE: 91 MMHG | DIASTOLIC BLOOD PRESSURE: 55 MMHG

## 2020-12-14 VITALS — SYSTOLIC BLOOD PRESSURE: 88 MMHG | DIASTOLIC BLOOD PRESSURE: 52 MMHG

## 2020-12-14 VITALS — DIASTOLIC BLOOD PRESSURE: 54 MMHG | SYSTOLIC BLOOD PRESSURE: 86 MMHG

## 2020-12-14 VITALS — SYSTOLIC BLOOD PRESSURE: 104 MMHG | DIASTOLIC BLOOD PRESSURE: 61 MMHG

## 2020-12-14 VITALS — DIASTOLIC BLOOD PRESSURE: 79 MMHG | SYSTOLIC BLOOD PRESSURE: 121 MMHG

## 2020-12-14 VITALS — DIASTOLIC BLOOD PRESSURE: 52 MMHG | SYSTOLIC BLOOD PRESSURE: 81 MMHG

## 2020-12-14 VITALS — DIASTOLIC BLOOD PRESSURE: 48 MMHG | SYSTOLIC BLOOD PRESSURE: 90 MMHG

## 2020-12-14 VITALS — SYSTOLIC BLOOD PRESSURE: 97 MMHG | DIASTOLIC BLOOD PRESSURE: 56 MMHG

## 2020-12-14 VITALS — DIASTOLIC BLOOD PRESSURE: 59 MMHG | SYSTOLIC BLOOD PRESSURE: 95 MMHG

## 2020-12-14 VITALS — DIASTOLIC BLOOD PRESSURE: 58 MMHG | SYSTOLIC BLOOD PRESSURE: 94 MMHG

## 2020-12-14 VITALS — DIASTOLIC BLOOD PRESSURE: 53 MMHG | SYSTOLIC BLOOD PRESSURE: 85 MMHG

## 2020-12-14 VITALS — DIASTOLIC BLOOD PRESSURE: 69 MMHG | SYSTOLIC BLOOD PRESSURE: 95 MMHG

## 2020-12-14 VITALS — DIASTOLIC BLOOD PRESSURE: 58 MMHG | SYSTOLIC BLOOD PRESSURE: 86 MMHG

## 2020-12-14 VITALS — DIASTOLIC BLOOD PRESSURE: 54 MMHG | SYSTOLIC BLOOD PRESSURE: 76 MMHG

## 2020-12-14 VITALS — SYSTOLIC BLOOD PRESSURE: 111 MMHG | DIASTOLIC BLOOD PRESSURE: 68 MMHG

## 2020-12-14 VITALS — DIASTOLIC BLOOD PRESSURE: 59 MMHG | SYSTOLIC BLOOD PRESSURE: 105 MMHG

## 2020-12-14 VITALS — DIASTOLIC BLOOD PRESSURE: 50 MMHG | SYSTOLIC BLOOD PRESSURE: 77 MMHG

## 2020-12-14 VITALS — DIASTOLIC BLOOD PRESSURE: 66 MMHG | SYSTOLIC BLOOD PRESSURE: 102 MMHG

## 2020-12-14 VITALS — SYSTOLIC BLOOD PRESSURE: 98 MMHG | DIASTOLIC BLOOD PRESSURE: 64 MMHG

## 2020-12-14 VITALS — SYSTOLIC BLOOD PRESSURE: 80 MMHG | DIASTOLIC BLOOD PRESSURE: 56 MMHG

## 2020-12-14 VITALS — DIASTOLIC BLOOD PRESSURE: 57 MMHG | SYSTOLIC BLOOD PRESSURE: 93 MMHG

## 2020-12-14 VITALS — WEIGHT: 179.24 LBS | BODY MASS INDEX: 26.55 KG/M2 | HEIGHT: 69 IN

## 2020-12-14 DIAGNOSIS — R57.9: ICD-10-CM

## 2020-12-14 DIAGNOSIS — T39.315A: ICD-10-CM

## 2020-12-14 DIAGNOSIS — Z20.828: ICD-10-CM

## 2020-12-14 DIAGNOSIS — G93.41: ICD-10-CM

## 2020-12-14 DIAGNOSIS — Z66: ICD-10-CM

## 2020-12-14 DIAGNOSIS — E46: ICD-10-CM

## 2020-12-14 DIAGNOSIS — Z98.41: ICD-10-CM

## 2020-12-14 DIAGNOSIS — K22.2: ICD-10-CM

## 2020-12-14 DIAGNOSIS — E03.9: ICD-10-CM

## 2020-12-14 DIAGNOSIS — Z98.42: ICD-10-CM

## 2020-12-14 DIAGNOSIS — I27.82: ICD-10-CM

## 2020-12-14 DIAGNOSIS — R44.0: ICD-10-CM

## 2020-12-14 DIAGNOSIS — C80.0: ICD-10-CM

## 2020-12-14 DIAGNOSIS — Z79.899: ICD-10-CM

## 2020-12-14 DIAGNOSIS — K56.7: ICD-10-CM

## 2020-12-14 DIAGNOSIS — J90: ICD-10-CM

## 2020-12-14 DIAGNOSIS — J81.0: ICD-10-CM

## 2020-12-14 DIAGNOSIS — I48.91: ICD-10-CM

## 2020-12-14 DIAGNOSIS — T40.2X5A: ICD-10-CM

## 2020-12-14 DIAGNOSIS — K26.9: ICD-10-CM

## 2020-12-14 DIAGNOSIS — Z51.5: ICD-10-CM

## 2020-12-14 DIAGNOSIS — I50.9: ICD-10-CM

## 2020-12-14 DIAGNOSIS — N18.30: ICD-10-CM

## 2020-12-14 DIAGNOSIS — C78.6: ICD-10-CM

## 2020-12-14 DIAGNOSIS — Z96.641: ICD-10-CM

## 2020-12-14 DIAGNOSIS — J96.01: ICD-10-CM

## 2020-12-14 DIAGNOSIS — N17.9: Primary | ICD-10-CM

## 2020-12-14 DIAGNOSIS — J91.0: ICD-10-CM

## 2020-12-14 DIAGNOSIS — E87.5: ICD-10-CM

## 2020-12-14 DIAGNOSIS — I13.0: ICD-10-CM

## 2020-12-14 DIAGNOSIS — Z79.01: ICD-10-CM

## 2020-12-14 DIAGNOSIS — R18.0: ICD-10-CM

## 2020-12-14 LAB
ALBUMIN SERPL BCG-MCNC: 1.8 GM/DL (ref 3.2–5.2)
ALT SERPL W P-5'-P-CCNC: 16 U/L (ref 12–78)
AMPHETAMINES UR QL SCN: NEGATIVE
APAP SERPL-MCNC: 2.4 UG/ML (ref 10–30)
APPEARANCE FLD: (no result)
APTT BLD: 27.7 SECONDS (ref 24.2–38.5)
APTT BLD: 30.3 SECONDS (ref 24.2–38.5)
BARBITURATES UR QL SCN: NEGATIVE
BASE EXCESS BLDA CALC-SCNC: -3.4 MMOL/L (ref -2–2)
BASE EXCESS BLDA CALC-SCNC: -5.9 MMOL/L (ref -2–2)
BASOPHILS # BLD AUTO: 0 10^3/UL (ref 0–0.2)
BASOPHILS NFR BLD AUTO: 0.2 % (ref 0–1)
BENZODIAZ UR QL SCN: NEGATIVE
BILIRUB CONJ SERPL-MCNC: 0.3 MG/DL (ref 0–0.2)
BILIRUB SERPL-MCNC: 0.4 MG/DL (ref 0.2–1)
BUN SERPL-MCNC: 69 MG/DL (ref 7–18)
BUN SERPL-MCNC: 71 MG/DL (ref 7–18)
BZE UR QL SCN: NEGATIVE
CALCIUM SERPL-MCNC: 7.7 MG/DL (ref 8.8–10.2)
CALCIUM SERPL-MCNC: 7.9 MG/DL (ref 8.8–10.2)
CANNABINOIDS UR QL SCN: NEGATIVE
CHLORIDE SERPL-SCNC: 102 MEQ/L (ref 98–107)
CHLORIDE SERPL-SCNC: 98 MEQ/L (ref 98–107)
CK MB CFR.DF SERPL CALC: 3.18
CK MB SERPL-MCNC: 3.4 NG/ML (ref ?–3.6)
CK SERPL-CCNC: 107 U/L (ref 39–308)
CO2 BLDA CALC-SCNC: 19.4 MEQ/L (ref 23–31)
CO2 BLDA CALC-SCNC: 22.9 MEQ/L (ref 23–31)
CO2 SERPL-SCNC: 22 MEQ/L (ref 21–32)
CO2 SERPL-SCNC: 25 MEQ/L (ref 21–32)
COLOR PLR: YELLOW
CREAT SERPL-MCNC: 5.33 MG/DL (ref 0.7–1.3)
CREAT SERPL-MCNC: 5.69 MG/DL (ref 0.7–1.3)
D DIMER PPP DDU-MCNC: > 4000 NG/ML (ref ?–500)
EOSINOPHIL # BLD AUTO: 0 10^3/UL (ref 0–0.5)
EOSINOPHIL NFR BLD AUTO: 0.1 % (ref 0–3)
ETHANOL SERPL-MCNC: < 0.003 % (ref 0–0.01)
GFR SERPL CREATININE-BSD FRML MDRD: 10.2 ML/MIN/{1.73_M2} (ref 35–?)
GFR SERPL CREATININE-BSD FRML MDRD: 11 ML/MIN/{1.73_M2} (ref 35–?)
GLUCOSE SERPL-MCNC: 108 MG/DL (ref 70–100)
GLUCOSE SERPL-MCNC: 89 MG/DL (ref 70–100)
HCO3 BLDA-SCNC: 18.4 MEQ/L (ref 22–26)
HCO3 BLDA-SCNC: 21.7 MEQ/L (ref 22–26)
HCO3 STD BLDA-SCNC: 19.5 MEQ/L (ref 22–26)
HCO3 STD BLDA-SCNC: 21.5 MEQ/L (ref 22–26)
HCT VFR BLD AUTO: 35.4 % (ref 42–52)
HCT VFR BLD AUTO: 38.2 % (ref 42–52)
HGB BLD-MCNC: 11.3 G/DL (ref 13.5–17.5)
HGB BLD-MCNC: 12.2 G/DL (ref 13.5–17.5)
INR PPP: 1.23
INR PPP: 1.29
LDH FLD L TO P-CCNC: 352 U/L
LDH SERPL L TO P-CCNC: 556 U/L (ref 87–241)
LYMPHOCYTES # BLD AUTO: 0.3 10^3/UL (ref 1.5–5)
LYMPHOCYTES NFR BLD AUTO: 3 % (ref 24–44)
MCH RBC QN AUTO: 28.4 PG (ref 27–33)
MCH RBC QN AUTO: 28.8 PG (ref 27–33)
MCHC RBC AUTO-ENTMCNC: 31.9 G/DL (ref 32–36.5)
MCHC RBC AUTO-ENTMCNC: 31.9 G/DL (ref 32–36.5)
MCV RBC AUTO: 89 FL (ref 80–96)
MCV RBC AUTO: 90.1 FL (ref 80–96)
METHADONE UR QL SCN: NEGATIVE
MONOCYTES # BLD AUTO: 0.6 10^3/UL (ref 0–0.8)
MONOCYTES NFR BLD AUTO: 5.2 % (ref 0–5)
NEUTROPHILS # BLD AUTO: 9.9 10^3/UL (ref 1.5–8.5)
NEUTROPHILS NFR BLD AUTO: 91 % (ref 36–66)
OPIATES UR QL SCN: NEGATIVE
PCO2 BLDA: 32.8 MMHG (ref 35–45)
PCO2 BLDA: 39.6 MMHG (ref 35–45)
PCP UR QL SCN: NEGATIVE
PH BLDA: 7.36 UNITS (ref 7.35–7.45)
PH BLDA: 7.37 UNITS (ref 7.35–7.45)
PH FLD: 7.52 UNITS
PLATELET # BLD AUTO: 390 10^3/UL (ref 150–450)
PLATELET # BLD AUTO: 403 10^3/UL (ref 150–450)
PO2 BLDA: 56.5 MMHG (ref 75–100)
PO2 BLDA: 59.7 MMHG (ref 75–100)
POTASSIUM SERPL-SCNC: 5 MEQ/L (ref 3.5–5.1)
POTASSIUM SERPL-SCNC: 5.3 MEQ/L (ref 3.5–5.1)
PROT SERPL-MCNC: 5.6 GM/DL (ref 6.4–8.2)
PROTHROMBIN TIME: 15.8 SECONDS (ref 12.5–14.3)
PROTHROMBIN TIME: 16.4 SECONDS (ref 12.5–14.3)
RBC # BLD AUTO: 3.93 10^6/UL (ref 4.3–6.1)
RBC # BLD AUTO: 4.29 10^6/UL (ref 4.3–6.1)
RSV RNA NPH QL NAA+PROBE: NEGATIVE
SALICYLATES SERPL-MCNC: 1.7 MG/DL (ref 5–30)
SAO2 % BLDA: 89.7 % (ref 95–99)
SAO2 % BLDA: 90.4 % (ref 95–99)
SODIUM SERPL-SCNC: 133 MEQ/L (ref 136–145)
SODIUM SERPL-SCNC: 135 MEQ/L (ref 136–145)
SPECIMEN SOURCE FLD: (no result)
TROPONIN I SERPL-MCNC: 0.06 NG/ML (ref ?–0.1)
WBC # BLD AUTO: 10.9 10^3/UL (ref 4–10)
WBC # BLD AUTO: 4.4 10^3/UL (ref 4–10)

## 2020-12-14 PROCEDURE — 0W9B3ZX DRAINAGE OF LEFT PLEURAL CAVITY, PERCUTANEOUS APPROACH, DIAGNOSTIC: ICD-10-PCS | Performed by: RADIOLOGY

## 2020-12-14 RX ADMIN — DEXTROSE MONOHYDRATE SCH MLS/HR: 50 INJECTION, SOLUTION INTRAVENOUS at 22:37

## 2020-12-14 RX ADMIN — OMEPRAZOLE SCH MG: 20 CAPSULE, DELAYED RELEASE ORAL at 22:36

## 2020-12-14 RX ADMIN — POLYETHYLENE GLYCOL 3350 SCH PKT: 17 POWDER, FOR SOLUTION ORAL at 13:16

## 2020-12-14 RX ADMIN — OMEPRAZOLE SCH MG: 20 CAPSULE, DELAYED RELEASE ORAL at 13:15

## 2020-12-14 RX ADMIN — IPRATROPIUM BROMIDE AND ALBUTEROL SULFATE SCH ML: .5; 3 SOLUTION RESPIRATORY (INHALATION) at 23:31

## 2020-12-14 RX ADMIN — IPRATROPIUM BROMIDE AND ALBUTEROL SULFATE SCH ML: .5; 3 SOLUTION RESPIRATORY (INHALATION) at 19:10

## 2020-12-14 RX ADMIN — OXYCODONE HYDROCHLORIDE AND ACETAMINOPHEN SCH MG: 500 TABLET ORAL at 13:15

## 2020-12-14 RX ADMIN — MULTIPLE VITAMINS W/ MINERALS TAB SCH TAB: TAB at 13:15

## 2020-12-14 RX ADMIN — ENOXAPARIN SODIUM SCH MG: 80 INJECTION SUBCUTANEOUS at 22:36

## 2020-12-14 NOTE — HPEPDOC
General


Date of Admission





Date of Service:  Dec 14, 2020


Primary Care Physician:  Johnna Emerson


Attending Physician:  Tank Villalobos MD


Chief Complaint


The patient is a 80-year-old male admitted with a reason for visit of 

Hallucinations.


Source:  Patient, Old records


Exam Limitations:  No limitations





History of Present Illness


(This H&P is an up-to-date to the H&P done within the last 30 days on 

11/19/2020. Any details that are missing from this document should be out on 

that one.)





Mr. Childers comes to the ER with concerns for hallucinations. His wife found him 

having a one-sided argument. When she asked what he was doing he explained that 

he was hearing voices and disagreed with what they're saying and so he was 

arguing with them about it. He had insight into the fact that there was no one 

there he was talking to, nonetheless, he felt it important to address the things

they were saying.





In the emergency department he also reported auditory hallucinations to the ER 

physician, again with good insight to the fact that these were not actual 

voices. However, when the ER doctor returned to his room he was again having a 

heated argument with himself.





Home Medications


Scheduled


Apixaban (Eliquis) 5 Mg Tablet, 5 MG PO BID, (Reported)


Ascorbic Acid (Ascorbic Acid) 500 Mg Tablet, 1,000 MG PO DAILY, (Reported)


Furosemide (Furosemide) 40 Mg Tablet, 40 MG PO DAILY, (Reported)


Multivitamins (Thera M Plus Tablet) 1 Each Tablet, 1 TAB PO DAILY, (Reported)


Omeprazole (Omeprazole) 40 Mg Capsule.dr, 40 MG PO BID, (Reported)


Polyethylene Glycol 3350 (Polyethylene Glycol 3350) 510 Gm Powder, 17 GRAM PO 

DAILY, (Reported)





Scheduled PRN


Ondansetron HCl (Ondansetron HCl) 4 Mg Tablet, 4 MG PO BID PRN for NAUSEA OR 

VOMITING, (Reported)


Oxycodone HCl (Oxycodone HCl) 5 Mg Tablet, 5 MG PO QID PRN for PAIN, (Reported)





Allergies


Coded Allergies:  


     No Known Allergies (Unverified , 11/12/20)





Past Medical History


Medical History


Metastatic carcinoma, unknown primary


(Chronic) pulmonary embolism, discharge from the hospital on apixaban 3-4 weeks 

ago


Esophageal stricture


Hypertension


Right hip osteoarthritis


Surgical History


Polypectomy, 2009, 2012, 2015


Bilateral cataracts, 2014


Right total hip replacement, 2015


Bilateral carpal tunnel release, 2015


Laparoscopy with biopsy of peritoneal tumor nodules, 11/2020





Family History


Father: Lung cancer


Mother: Colon cancer


Denies any family history of breast or prostate cancer





Social History


Marital status: 


Resides in: Lives in [place] with his wife. 


Employment: Retired private-


Tobacco use: Current nonsmoker, patient reports intermittently smoking pipes and

cigars but has not done so for 30 years.


ETOH: Denies any current alcohol use


Illicit drug use: Denies any current illicit or IV drug use





A-FIB/CHADSVASC


A-FIB History


Current/History of A-Fib/PAF?:  No


Current PO Anticoag Therapy:  Yes





Review of Systems


Constitutional:  Reports: Weakness; 


   Denies: Chills, Fever


ENT:  Denies: Dysphagia, Sore Throat, Epistaxis


Skin:  Denies: Rash, Lesions


Pulmonary:  Denies: Dyspnea, Cough, Pleuritic Chest Pain


Cardiovascular:  Denies: Chest Pain, Palpitations


Gastrointestinal:  Reports: Abdominal Pain; 


   Denies: Nausea, Vomiting, Diarrhea, Constipation, Melena, Hematochezia


Genitourinary:  Denies: Dysuria, Hematuria


Hematologic:  Denies: Bruising, Bleeding Excessively


Neurological:  Denies: Weakness, Change in speech, Confusion


Psych:  Reports: Mood Normal, Other Psych (auditory hallucinations)





Physical Examination


General Exam:  Positive: Alert, Cooperative, No Acute Distress (laying 

comfortably in his ER stretcher when I entered the room)


Eye Exam:  Positive: PERRLA, Conjunctiva & lids normal, Other Eye Symptoms 

(beginning to have a sunken appearance); 


   Negative: Sclera icteric


ENT Exam:  Positive: Atraumatic, Mucous membr. moist/pink, Pharynx Normal


Neck Exam:  Positive: Supple; 


   Negative: Lymphadenopathy (including supraclavicular)


Chest Exam:  Positive: Diminished; 


   Negative: Normal air movement (basically normal no movement of air noted in 

the left posterior base. Dull to percussion to just under half way up the left 

posterior lung field)


Heart Exam:  Positive: Rate Normal, Regular Rhythm, Normal S1, Normal S2, 

Murmurs (there is a 3/6 midsystolic crescendo decrescendo murmur noted at the 

right upper sternal border); 


   Negative: Rubs


Abdomen Exam:  Positive: Soft; 


   Negative: Normal bowel sounds, Tenderness, Hepatospenomegaly


Extremity Exam:  Positive: Normal pulses; 


   Negative: Clubbing, Cyanosis, Edema


Skin Exam:  Positive: Nl turgor and temperature; 


   Negative: Breakdown, Lesion


Neuro Exam:  Positive: Normal Gait, Normal Speech, Cranial Nerves 3-12 NL, Refl

exes 2+


Psych Exam:  Positive: Mental status NL, Mood NL, Oriented x 3





Vital Signs





Vital Signs








  Date Time  Temp Pulse Resp B/P (MAP) Pulse Ox O2 Delivery O2 Flow Rate FiO2


 


12/14/20 01:09 97.9 92 20 95/53 89 Room Air  











Laboratory Data


Labs 24H


Laboratory Tests 2


12/14/20 02:30: 


Immature Granulocyte % (Auto) 0.5, Neutrophils (%) (Auto) 91.0H, Lymphocytes (%)

(Auto) 3.0L, Monocytes (%) (Auto) 5.2H, Eosinophils (%) (Auto) 0.1, Basophils 

(%) (Auto) 0.2, Neutrophils # (Auto) 9.9H, Lymphocytes # (Auto) 0.3L, Monocytes 

# (Auto) 0.6, Eosinophils # (Auto) 0.0, Basophils # (Auto) 0.0, Nucleated Red 

Blood Cells % (auto) 0.0, Prothrombin Time 16.4H, Prothromb Time International 

Ratio 1.29, Activated Partial Thromboplast Time 30.3, Anion Gap 10, Glomerular 

Filtration Rate 11.0L, Lactic Acid Level 1.0, Calcium Level 7.7L, Total 

Bilirubin 0.4, Direct Bilirubin 0.3H, Aspartate Amino Transf (AST/SGOT) 47H, 

Alanine Aminotransferase (ALT/SGPT) 16, Alkaline Phosphatase 62, Ammonia < 10, 

Total Creatine Kinase 107, Creatine Kinase MB 3.4, Creatine Kinase MB Relative 

Index 3.18, Troponin I 0.06, Total Protein 5.6L, Albumin 1.8L, Albumin/Globulin 

Ratio 0.5, Thyroid Stimulating Hormone (TSH) 25.300H, Salicylates Level 1.7L, 

Acetaminophen Level 2.4L, Ethyl Alcohol Level < 0.003


12/14/20 02:42: Bedside Glucose (Misc Panel) 95


12/14/20 03:21: 


Urine Color JONH, Urine Appearance TURBIDH, Urine pH 5.0, Urine Specific 

Gravity 1.014, Urine Protein 1+H, Urine Glucose (UA) NEGATIVE, Urine Ketones 

NEGATIVE, Urine Blood 1+H, Urine Nitrite NEGATIVE, Urine Bilirubin NEGATIVE, 

Urine Urobilinogen 0.2, Urine Leukocyte Esterase NEGATIVE, Urine WBC (Auto) 6H, 

Urine RBC (Auto) 4H, Urine Hyaline Casts (Auto) 0, Urine Bacteria (Auto) 

NEGATIVE, Urine Squamous Epithelial Cells 0, Urine Amorphous Sediment SMALLH, 

Urine Sperm (Auto) , Urine Opiates Screen NEGATIVE, Urine Methadone Screen 

NEGATIVE, Urine Barbiturates Screen NEGATIVE, Urine Phencyclidine Screen 

NEGATIVE, Urine Amphetamines Screen NEGATIVE, Urine Benzodiazepines Screen 

NEGATIVE, Urine Cocaine Metabolite Screen NEGATIVE, Urine Cannabinoids Screen 

NEGATIVE


12/14/20 05:19: 


Coronavirus (COVID-19)(PCR) NEGATIVE, Influenza Type A (RT-PCR) NEGATIVE, 

Influenza Type B (RT-PCR) NEGATIVE, Respiratory Syncytial Virus (PCR) NEGATIVE


CBC/BMP


Laboratory Tests


12/14/20 02:30











Problems





(1) Acute renal failure


(2) Auditory hallucination


(3) Metastatic carcinoma


Status:  Acute


(4) Pleural effusion


Status:  Acute


(5) Pulmonary embolism


Status:  Acute


(6) Protein-calorie malnutrition


(7) HTN (hypertension)











Tank Villalobos MD             Dec 14, 2020 06:46

## 2020-12-14 NOTE — IPNPDOC
Text Note


Date of Service


The patient was seen on 12/14/20 at 2015.





NOTE


I received a call from Dr. Jung to discuss Mr. Childers. He is concerned that 

some of his current respiratory symptoms may be related to a DVT and/or PE. 

Interestingly, Mr. Childers came in on apixaban. I initially ordered at as part of 

his home medications, but the pharmacist questioned this as his creatinine 

clearance has dropped so low. I agreed to hold it for this reason. 

Unfortunately, we are still in the same position with regards to his renal 

function, but are looking at reasons to anticoagulate him again. Additionally, a

DOAC may not be the best option given we are concerned about hypercoagulability 

of malignancy. Warfarin is probably too slow, both to get to therapeutic and to 

reverse if he needs a significant procedure or has a severe complication. This 

leaves us with renally dosed Lovenox. I confirmed with pharmacy that the 

appropriate full treatment dose for him is 80mg SC daily and ordered this.





VS,Fishbone, I+O


VS, Fishbone, I+O


Laboratory Tests


12/14/20 02:30








12/14/20 13:48











Vital Signs








  Date Time  Temp Pulse Resp B/P (MAP) Pulse Ox O2 Delivery O2 Flow Rate FiO2


 


12/14/20 19:00 96.4 128 20 95/69 (78) 87 NIPPV (BIPAP/CPAP)  100


 


12/14/20 17:45       15.0 

















Tank Villalobos MD             Dec 14, 2020 20:29

## 2020-12-14 NOTE — REP
INDICATION:

LEFT PLEURAL FLUID



COMPARISON:

None.



TECHNIQUE:

The procedure was performed by Terri Ramachandran [facility],  under the direct

supervision of Dr. Braxton



The risks and benefits of the procedure were explained to the patient and an informed

consent was obtained both verbally and written.  Directly prior to the start of the

procedure a formal time-out was completed in the procedure room.



Pleural fluid in left lung zone was localized using ultrasound guidance.  The skin was

prepped and draped in a sterile fashion.  Five ML of buffered lidocaine was used as a

local anesthetic. Using ultrasound guidance an 8-Lebanese multi side-hole catheter was

inserted using trocar technique.



FINDINGS:

One thousand eight hundred mL of yellow colored fluid was withdrawn and sent to the

laboratory for further analysis.



The patient tolerated the procedure well and there were no immediate complications.

After the appropriate amount of monitored convalescence, the patient was discharged

from the department.



IMPRESSION:

Ultrasound-guided thoracentesis of the left chest, with removal of 1800 mL of pleural

fluid.





<Electronically signed by Terri Ramachandran > 12/14/20 [id number]

<Electronically signed by Alex Braxton > 12/14/20 1936

## 2020-12-14 NOTE — REPVR
PROCEDURE INFORMATION: 

Exam: CT Head Without Contrast 

Exam date and time: 12/14/2020 1:44 AM 

Age: 80 years old 

Clinical indication: Altered mental status/memory loss 



TECHNIQUE: 

Imaging protocol: Computed tomography of the head without contrast. 

Radiation optimization: All CT scans at this facility use at least one of these 

dose optimization techniques: automated exposure control; mA and/or kV 

adjustment per patient size (includes targeted exams where dose is matched to 

clinical indication); or iterative reconstruction. 



COMPARISON: 

No relevant prior studies available. 



FINDINGS: 

Brain: No intracranial mass, focal mass effect or midline shift. No acute 

intracranial hemorrhage. Mild decreased attenuation in periventricular/centrum 

semiovale white matter. No focal effacement of cortical sulci to indicate acute 

cortical infarct. Prior infarct, right parietal, with encephalomalacia and 

compensatory right lateral ventricle dilatation 

Prominent ventricles and CSF spaces suggest parenchymal volume loss. 

Bones/joints: No calvarial fracture or destructive process. 

Paranasal sinuses: Visualized paranasal sinuses are unremarkable. 

Mastoid air cells: Mastoid air cells are normally aerated. 

Orbital cavity: Visualized globes and orbits are unremarkable. 

Soft tissues: No focal extracranial soft tissue swelling. 



IMPRESSION: 

1. No acute intracranial abnormality. 

2. Atrophy, remote right parietal infarct and chronic microangiopathic change 

in supratentorial white matter. 



Electronically signed by: Roque Flores On 12/14/2020  01:57:19 AM

## 2020-12-14 NOTE — REPVR
PROCEDURE INFORMATION: 

Exam: XR Chest, 1 View 

Exam date and time: 12/14/2020 10:10 PM 

Age: 80 years old 

Clinical indication: Other vascular access device placement or adjustment; 

Central line, non-tunnelled; Additional info: Central line placement 



TECHNIQUE: 

Imaging protocol: XR of the chest 

Views: 1 view. 



COMPARISON: 

[facility] PORTABLE CHEST X-RAY 12/14/2020 5:14 PM 



FINDINGS: 

Lungs: Persistent bibasilar airspace disease, not significantly changed. 

Pleural space: No pneumothorax. Probable small left pleural effusion. 

Heart/Mediastinum: Unremarkable. No cardiomegaly. 

Vasculature: Right subclavian central venous line projects at the atrial caval 

junction. 

Bones/joints: Skeletal degenerative changes are noted. 



IMPRESSION: 

1. Right subclavian line projects at the atrial caval junction. No 

pneumothorax. 

2. Persistent bilateral airspace disease and small left pleural effusion. 



Electronically signed by: Evens Art On 12/14/2020  22:52:45 PM

## 2020-12-14 NOTE — REPVR
PROCEDURE INFORMATION: 

Exam: XR Chest, 1 View 

Exam date and time: 12/14/2020 1:55 AM 

Age: 80 years old 

Clinical indication: Other: AMS; Additional info: Altered mental status 



TECHNIQUE: 

Imaging protocol: XR of the chest 

Views: 1 view. 



COMPARISON: 

CR Abdomen,Flat Upright,PA CHEST 11/19/2020 3:49 PM 



FINDINGS:



Large left pleural effusion, perhaps marginally increased since the prior exam. 

 Adjacent compressive atelectasis or consolidation in the left lung.  No 

underlying pulmonary edema.  No pneumothorax.  No mediastinal lymphadenopathy.  

Right lung is unremarkable.  Degenerative changes are seen in bilateral 

glenohumeral joints



IMPRESSION:



Left pleural effusion, perhaps increased in size over the interim with adjacent 

atelectasis or consolidation



Electronically signed by: Roque Flores On 12/14/2020  02:04:00 AM

## 2020-12-14 NOTE — IPNPDOC
Text Note


Date of Service


The patient was seen on 12/14/20.





NOTE


Subjective


Patient was seen and examined this morning in the ER. The patient seems to be 

comfortable, alert to person and place, not time. He is scheduled to go for an 

IR guided thoracocentesis today.








Physical examination


GENERAL APPEARANCE: Laying flat in bed awake, alert, no acute distress. 


HEENT: Normocephalic, atraumatic, symmetric, EOMI, PERRLA, mucous membranes medhat

st.


CARDIOVASCULAR: Regular rate and rhythm without appreciable murmur/ rub or 

gallop


LUNGS: Lung sounds diminished in the bases bilaterally, more so on the left than

the right. Otherwise good air movement with symmetric chest rise. No appreciable

wheezing or rhonchi


ABDOMEN: Distended, round, mild tenderness in the lower quadrants bilaterally, 

good bowel sounds appreciated. No overlying skin changes


MUSCULOSKELETAL: Patient is able to move all extremities equally bilaterally


EXTREMITIES: No edema No calf tenderness


NEUROLOGICAL: Strength 5 out of 5 in upper and lower extremities. No facial dr

oop, aphasia or dysarthria. Sensorium intact


PSYCHIATRIC: Mood and affect are appropriate given patient's current medical 

conditions.





Labs: Reviewed





Radiology : Reviewed.





Assessment and plan





Patient is an 80-year-old male, past medical history significant for recently 

diagnosed metastatic disease of unknown primary source with omental  and 

mesenteric deposits with Ascites. He presented to the emergency department the 

evening on 12/13/20 with alteration in mental status. As per him, he took his 

pain medication, oxycodone, and after that was confused and as per his wife was 

talking to somebody which was not even in the room. On my encounter. He states 

that he does not remember anything like that. He has been following up with 

hematology oncology and they have scheduled port placement and the workup for 

the primary. He got a CT scan of the head which showed no acute pathology and an

x-ray of the lungs showed increasing effusion in the left lung which has been 

more than noticed previously. He also has a distended abdomen with a fluid 

thrill. He was also found to have in acute kidney injury with a creatinine of 

5.3 and potassium of 5.3 Currently, he has been admitted for alteration in 

mental status likely secondary to uremia and polypharmacy with oxycodone along 

with acute kidney injury





1. Alteration in mental status. Under evaluation could be multifactorial right 

earache related to uremia because of AK I with a creatinine of 5.3 and 

polypharmacy with oxycodone. CT scan of the head is negative other metabolic 

causes have been ruled out. Infectious workup has been started. We will address 

individual cause.





2. Acute kidney injury. The patient is not providing much history but states 

that he has been taking Aleve but does not quantify how much for the last couple

of weeks. This could be a reason of his acute kidney injury. We will get a renal

sonogram and a Lemons catheter reinserted to rule out any postobstructive 

pathology. Given his ascites. It's hard to examine his suprapubic fullness. We 

will continue. Input output monitoring. Avoid any nephrotoxic drugs.





3. Metastatic cancer with Omental/Peritoneal Mets, unknown primary : There were 

malignant cells in the ascites however immunohistochemistry could not make a 

definitive diagnosis from the cells due to the paucity of the cells. . He has a 

Dr. Bui as a his oncologist and will follow up with him once ACUTE issues 

are resolved. 





4. Recurrent pleural effusions. He has a large left-sided pleural effusion. Week

for thoracocentesis. IR today. We will continue to follow. Will set LDH and 

total protein to rule out exudative versus transudative, but apparently last 

time it was transudative.





5. Hyperkalemia. Related to petechiae. We will hold any nephrotoxic drugs and 

monitor. Hyperkalemia





6. Hypertension. Hold any nephrotoxic antihypertensive drugs and continue to 

monitor.





7. Ascites. Likely secondary to omental and peritoneal metastasis. We will get 

paracentesis if required.





8. Esophageal stenosis : S/P endoscopic evaluation on 11/17 by Dr. Quezada.  

Biopsy did not show any malignancy . Continue PPI





9. Duodenal ulcer and erossive gastropathy seen in EGD on 11/17/20. Duodenal 

mucosa with extensive ulceration and reparative changes. Negative for 

malignancy. Continue PPI





DVT PROPHYLAXIS:: Lovenox





CODE STATUS: Patient confirms DNR/trial of intubation





VS,Fishbone, I+O


VS, Fishbone, I+O


Laboratory Tests


12/14/20 02:30











Vital Signs








  Date Time  Temp Pulse Resp B/P (MAP) Pulse Ox O2 Delivery O2 Flow Rate FiO2


 


12/14/20 07:00    128/79 (95)    


 


12/14/20 06:57  121   87   


 


12/14/20 01:09 97.9  20   Room Air  

















JOSE ELIAS YU MD             Dec 14, 2020 11:26

## 2020-12-14 NOTE — REP
INDICATION:

sob.



COMPARISON:

Comparison radiograph December 14, 2020 11:43 a.m. patient is status post left

thoracentesis.



TECHNIQUE:

Portable upright AP chest radiograph.



FINDINGS:

There is a new pattern of alveolar parenchymal changes throughout the right lung and

in the left inferior perihilar region.  There is some blunting of the left lateral

pleural angle again noted.  There is no evidence of pneumothorax.  Oxygen tubing is

seen.  Heart is not felt to be enlarged..  Advanced arthropathy is again noted in the

shoulders.



IMPRESSION:

Pulmonary edema pattern right lung and left inferior perihilar region.  Blunting of

the left lateral pleural angle again noted.  There is no evidence of pneumothorax..





<Electronically signed by Alex Braxton > 12/14/20 [id number]

## 2020-12-14 NOTE — REP
INDICATION:

POST LEFT THORA, 2 VIEW. Status post left thoracentesis, 1800 mL.



COMPARISON:

Comparison chest x-ray December 14, 2020..



TECHNIQUE:

Sitting AP portable chest x-ray.



FINDINGS:

The left pleural effusion is much improved.  There is still some blunting of the

lateral pleural angle.  There is no evidence of pneumothorax.  Oxygen delivery tubing

is seen.  Heart is not felt to be enlarged.  No new infiltrate is seen.



IMPRESSION:

Improved left hemithorax post left thoracentesis.  No complication seen.





<Electronically signed by Alex Braxton > 12/14/20 [id number]

## 2020-12-14 NOTE — REPVR
PROCEDURE INFORMATION: 

Exam: US Duplex Lower Extremity Veins, Bilateral 

Exam date and time: 12/14/2020 9:26 PM 

Age: 80 years old 

Clinical indication: Pain; Leg, lower; Left; Additional info: ? Dvt/pe 



TECHNIQUE: 

Imaging protocol: Real-time duplex ultrasound of the extremities with 2-D gray 

scale, color Doppler flow and spectral waveform analysis with image 

documentation. Complete exam focused on the bilateral lower extremity veins. 



COMPARISON: 

No relevant prior studies available. 



FINDINGS: 

Right deep veins: Unremarkable. The common femoral, femoral, proximal profunda 

femoral and popliteal veins are patent without thrombus. Normal Doppler 

waveforms. Normal compressibility and/or augmentation response.  

Right superficial veins: Saphenofemoral junction is patent without thrombus. 



Left deep veins: Unremarkable. The common femoral, femoral, proximal profunda 

femoral and popliteal veins are patent without thrombus. Normal Doppler 

waveforms. Normal compressibility and/or augmentation response.  

Left superficial veins: Saphenofemoral junction is patent without thrombus. 



Soft tissues: Unremarkable. 



IMPRESSION: 

No evidence of deep vein thrombosis. 



Electronically signed by: Evens Art On 12/14/2020  21:55:31 PM

## 2020-12-14 NOTE — IPN
PROGRESS NOTE



DATE:  12/14/2020



SUBJECTIVE:  Mr. Childers was on an emergency basis for acute respiratory

distress. Dr. Fontenot rounded on him today and evaluated him this evening for

increasing shortness of breath. The patient became abruptly dyspneic around

5:30 PM, and I went to see him and evaluate him at the bedside.



A quick review of his chart shows that he has metastatic adenocarcinoma,

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

malignant pleural effusion. He underwent a thoracentesis today; 1,800 mL of

fluid was withdrawn. He has been increasingly dyspneic, tachycardic and

hypotensive since then. He presented for admission last night with acute renal

failure. His creatinine was 5.3 with a baseline creatinine around 1.7. He also

has protein calorie malnutrition with a cumulative 1.8. 



PHYSICAL EXAMINATION:  

VITAL SIGNS: On evaluation his blood pressure is 90/48, pulse is 130,

respiratory rate is around 24, his 02 saturation was 86% on a non-rebreather.

GENERAL APPEARANCE: He looks chronically ill and is dyspneic. 

LUNGS: Rales to the apices bilaterally, more apparent on the left than the

right. 

HEART: Tachycardic, regular rate and rhythm.

ABDOMEN: Ascites. There is trace peripheral edema. 



IMAGING: Chest x-ray shows acute pulmonary edema. The right lung is changed

from previous chest x-ray of 5 hours earlier. 



IMPRESSION:  

1.  Acute renal failure  suspect acute pulmonary edema, probably reexpansion

    after thoracentesis. He was given 40 multifactorial of Lasix intravenously

    but with a creatinine of 5.3, he probably needs more aggressive diuresis

    than that. Unfortunately his systolic pressure is only  which limits

    current therapy.



    The patient is being transferred stat to the Intensive Care Unit. I

    discussed the case with Dr. Jung, who is on call for critical care. Stat

    ABG, CBC, BMP have been ordered. Nebulized bronchodilator has been ordered.

    The patient is DNR with a trial of intubation. 

2.  Acute renal failure superimposed on stage 3 chronic kidney disease  stat

    repeat BMP has been ordered. Consultation obtained from Nephrology. Case was

    discussed with Dr. Madrigal. He will see the patient in consultation. 

3.  Metastatic adenocarcinoma, unknown primary  I have reviewed his pathology.

    Multiple primaries possible including pancreatic gastrointestinal or

    pulmonary. Prognosis is poor. He is followed by [facility]

    [place]. Dr. Bui is his current Oncologist. 

4.  History of pulmonary embolism  I am holding his Eliquis until his current

    situation clarifies. We need to see what his renal function is before

    restarting this.   

5.  Prognosis is poor due to multiple underlying medical problems and acute

    deterioration.



The patient will be signed out to the nighttime Hospitalist for reevaluation

## 2020-12-15 VITALS — DIASTOLIC BLOOD PRESSURE: 68 MMHG | SYSTOLIC BLOOD PRESSURE: 111 MMHG

## 2020-12-15 VITALS — SYSTOLIC BLOOD PRESSURE: 111 MMHG | DIASTOLIC BLOOD PRESSURE: 51 MMHG

## 2020-12-15 VITALS — DIASTOLIC BLOOD PRESSURE: 53 MMHG | SYSTOLIC BLOOD PRESSURE: 95 MMHG

## 2020-12-15 VITALS — SYSTOLIC BLOOD PRESSURE: 95 MMHG | DIASTOLIC BLOOD PRESSURE: 59 MMHG

## 2020-12-15 VITALS — SYSTOLIC BLOOD PRESSURE: 92 MMHG | DIASTOLIC BLOOD PRESSURE: 57 MMHG

## 2020-12-15 VITALS — SYSTOLIC BLOOD PRESSURE: 112 MMHG | DIASTOLIC BLOOD PRESSURE: 62 MMHG

## 2020-12-15 VITALS — SYSTOLIC BLOOD PRESSURE: 105 MMHG | DIASTOLIC BLOOD PRESSURE: 60 MMHG

## 2020-12-15 VITALS — SYSTOLIC BLOOD PRESSURE: 97 MMHG | DIASTOLIC BLOOD PRESSURE: 58 MMHG

## 2020-12-15 VITALS — SYSTOLIC BLOOD PRESSURE: 106 MMHG | DIASTOLIC BLOOD PRESSURE: 60 MMHG

## 2020-12-15 VITALS — DIASTOLIC BLOOD PRESSURE: 58 MMHG | SYSTOLIC BLOOD PRESSURE: 93 MMHG

## 2020-12-15 VITALS — SYSTOLIC BLOOD PRESSURE: 101 MMHG | DIASTOLIC BLOOD PRESSURE: 57 MMHG

## 2020-12-15 VITALS — DIASTOLIC BLOOD PRESSURE: 68 MMHG | SYSTOLIC BLOOD PRESSURE: 109 MMHG

## 2020-12-15 VITALS — DIASTOLIC BLOOD PRESSURE: 64 MMHG | SYSTOLIC BLOOD PRESSURE: 103 MMHG

## 2020-12-15 VITALS — DIASTOLIC BLOOD PRESSURE: 67 MMHG | SYSTOLIC BLOOD PRESSURE: 114 MMHG

## 2020-12-15 VITALS — SYSTOLIC BLOOD PRESSURE: 128 MMHG | DIASTOLIC BLOOD PRESSURE: 77 MMHG

## 2020-12-15 VITALS — DIASTOLIC BLOOD PRESSURE: 60 MMHG | SYSTOLIC BLOOD PRESSURE: 94 MMHG

## 2020-12-15 VITALS — SYSTOLIC BLOOD PRESSURE: 131 MMHG | DIASTOLIC BLOOD PRESSURE: 66 MMHG

## 2020-12-15 VITALS — DIASTOLIC BLOOD PRESSURE: 62 MMHG | SYSTOLIC BLOOD PRESSURE: 105 MMHG

## 2020-12-15 VITALS — DIASTOLIC BLOOD PRESSURE: 51 MMHG | SYSTOLIC BLOOD PRESSURE: 78 MMHG

## 2020-12-15 VITALS — DIASTOLIC BLOOD PRESSURE: 55 MMHG | SYSTOLIC BLOOD PRESSURE: 100 MMHG

## 2020-12-15 VITALS — DIASTOLIC BLOOD PRESSURE: 59 MMHG | SYSTOLIC BLOOD PRESSURE: 108 MMHG

## 2020-12-15 VITALS — DIASTOLIC BLOOD PRESSURE: 80 MMHG | SYSTOLIC BLOOD PRESSURE: 130 MMHG

## 2020-12-15 VITALS — SYSTOLIC BLOOD PRESSURE: 108 MMHG | DIASTOLIC BLOOD PRESSURE: 55 MMHG

## 2020-12-15 VITALS — SYSTOLIC BLOOD PRESSURE: 103 MMHG | DIASTOLIC BLOOD PRESSURE: 58 MMHG

## 2020-12-15 VITALS — SYSTOLIC BLOOD PRESSURE: 92 MMHG | DIASTOLIC BLOOD PRESSURE: 59 MMHG

## 2020-12-15 VITALS — SYSTOLIC BLOOD PRESSURE: 123 MMHG | DIASTOLIC BLOOD PRESSURE: 67 MMHG

## 2020-12-15 VITALS — DIASTOLIC BLOOD PRESSURE: 61 MMHG | SYSTOLIC BLOOD PRESSURE: 91 MMHG

## 2020-12-15 VITALS — SYSTOLIC BLOOD PRESSURE: 93 MMHG | DIASTOLIC BLOOD PRESSURE: 56 MMHG

## 2020-12-15 VITALS — SYSTOLIC BLOOD PRESSURE: 110 MMHG | DIASTOLIC BLOOD PRESSURE: 55 MMHG

## 2020-12-15 VITALS — DIASTOLIC BLOOD PRESSURE: 63 MMHG | SYSTOLIC BLOOD PRESSURE: 100 MMHG

## 2020-12-15 VITALS — SYSTOLIC BLOOD PRESSURE: 92 MMHG | DIASTOLIC BLOOD PRESSURE: 55 MMHG

## 2020-12-15 VITALS — DIASTOLIC BLOOD PRESSURE: 57 MMHG | SYSTOLIC BLOOD PRESSURE: 105 MMHG

## 2020-12-15 VITALS — DIASTOLIC BLOOD PRESSURE: 52 MMHG | SYSTOLIC BLOOD PRESSURE: 92 MMHG

## 2020-12-15 VITALS — DIASTOLIC BLOOD PRESSURE: 58 MMHG | SYSTOLIC BLOOD PRESSURE: 99 MMHG

## 2020-12-15 VITALS — DIASTOLIC BLOOD PRESSURE: 57 MMHG | SYSTOLIC BLOOD PRESSURE: 90 MMHG

## 2020-12-15 VITALS — DIASTOLIC BLOOD PRESSURE: 57 MMHG | SYSTOLIC BLOOD PRESSURE: 99 MMHG

## 2020-12-15 VITALS — DIASTOLIC BLOOD PRESSURE: 70 MMHG | SYSTOLIC BLOOD PRESSURE: 112 MMHG

## 2020-12-15 VITALS — DIASTOLIC BLOOD PRESSURE: 63 MMHG | SYSTOLIC BLOOD PRESSURE: 108 MMHG

## 2020-12-15 VITALS — DIASTOLIC BLOOD PRESSURE: 57 MMHG | SYSTOLIC BLOOD PRESSURE: 101 MMHG

## 2020-12-15 VITALS — SYSTOLIC BLOOD PRESSURE: 109 MMHG | DIASTOLIC BLOOD PRESSURE: 66 MMHG

## 2020-12-15 VITALS — DIASTOLIC BLOOD PRESSURE: 60 MMHG | SYSTOLIC BLOOD PRESSURE: 101 MMHG

## 2020-12-15 VITALS — SYSTOLIC BLOOD PRESSURE: 105 MMHG | DIASTOLIC BLOOD PRESSURE: 62 MMHG

## 2020-12-15 VITALS — SYSTOLIC BLOOD PRESSURE: 124 MMHG | DIASTOLIC BLOOD PRESSURE: 58 MMHG

## 2020-12-15 VITALS — SYSTOLIC BLOOD PRESSURE: 89 MMHG | DIASTOLIC BLOOD PRESSURE: 51 MMHG

## 2020-12-15 VITALS — SYSTOLIC BLOOD PRESSURE: 97 MMHG | DIASTOLIC BLOOD PRESSURE: 61 MMHG

## 2020-12-15 VITALS — SYSTOLIC BLOOD PRESSURE: 100 MMHG | DIASTOLIC BLOOD PRESSURE: 61 MMHG

## 2020-12-15 VITALS — DIASTOLIC BLOOD PRESSURE: 60 MMHG | SYSTOLIC BLOOD PRESSURE: 92 MMHG

## 2020-12-15 VITALS — DIASTOLIC BLOOD PRESSURE: 57 MMHG | SYSTOLIC BLOOD PRESSURE: 89 MMHG

## 2020-12-15 VITALS — DIASTOLIC BLOOD PRESSURE: 59 MMHG | SYSTOLIC BLOOD PRESSURE: 99 MMHG

## 2020-12-15 VITALS — SYSTOLIC BLOOD PRESSURE: 99 MMHG | DIASTOLIC BLOOD PRESSURE: 57 MMHG

## 2020-12-15 VITALS — SYSTOLIC BLOOD PRESSURE: 150 MMHG | DIASTOLIC BLOOD PRESSURE: 82 MMHG

## 2020-12-15 VITALS — DIASTOLIC BLOOD PRESSURE: 57 MMHG | SYSTOLIC BLOOD PRESSURE: 92 MMHG

## 2020-12-15 VITALS — SYSTOLIC BLOOD PRESSURE: 101 MMHG | DIASTOLIC BLOOD PRESSURE: 62 MMHG

## 2020-12-15 VITALS — SYSTOLIC BLOOD PRESSURE: 98 MMHG | DIASTOLIC BLOOD PRESSURE: 62 MMHG

## 2020-12-15 VITALS — DIASTOLIC BLOOD PRESSURE: 50 MMHG | SYSTOLIC BLOOD PRESSURE: 90 MMHG

## 2020-12-15 VITALS — SYSTOLIC BLOOD PRESSURE: 92 MMHG | DIASTOLIC BLOOD PRESSURE: 50 MMHG

## 2020-12-15 VITALS — DIASTOLIC BLOOD PRESSURE: 55 MMHG | SYSTOLIC BLOOD PRESSURE: 95 MMHG

## 2020-12-15 VITALS — SYSTOLIC BLOOD PRESSURE: 92 MMHG | DIASTOLIC BLOOD PRESSURE: 58 MMHG

## 2020-12-15 VITALS — SYSTOLIC BLOOD PRESSURE: 133 MMHG | DIASTOLIC BLOOD PRESSURE: 68 MMHG

## 2020-12-15 VITALS — DIASTOLIC BLOOD PRESSURE: 62 MMHG | SYSTOLIC BLOOD PRESSURE: 108 MMHG

## 2020-12-15 VITALS — DIASTOLIC BLOOD PRESSURE: 71 MMHG | SYSTOLIC BLOOD PRESSURE: 122 MMHG

## 2020-12-15 VITALS — DIASTOLIC BLOOD PRESSURE: 58 MMHG | SYSTOLIC BLOOD PRESSURE: 98 MMHG

## 2020-12-15 VITALS — SYSTOLIC BLOOD PRESSURE: 109 MMHG | DIASTOLIC BLOOD PRESSURE: 52 MMHG

## 2020-12-15 VITALS — SYSTOLIC BLOOD PRESSURE: 111 MMHG | DIASTOLIC BLOOD PRESSURE: 60 MMHG

## 2020-12-15 VITALS — SYSTOLIC BLOOD PRESSURE: 112 MMHG | DIASTOLIC BLOOD PRESSURE: 56 MMHG

## 2020-12-15 VITALS — SYSTOLIC BLOOD PRESSURE: 104 MMHG | DIASTOLIC BLOOD PRESSURE: 63 MMHG

## 2020-12-15 VITALS — SYSTOLIC BLOOD PRESSURE: 137 MMHG | DIASTOLIC BLOOD PRESSURE: 68 MMHG

## 2020-12-15 VITALS — SYSTOLIC BLOOD PRESSURE: 102 MMHG | DIASTOLIC BLOOD PRESSURE: 63 MMHG

## 2020-12-15 VITALS — SYSTOLIC BLOOD PRESSURE: 101 MMHG | DIASTOLIC BLOOD PRESSURE: 54 MMHG

## 2020-12-15 VITALS — DIASTOLIC BLOOD PRESSURE: 72 MMHG | SYSTOLIC BLOOD PRESSURE: 109 MMHG

## 2020-12-15 VITALS — SYSTOLIC BLOOD PRESSURE: 103 MMHG | DIASTOLIC BLOOD PRESSURE: 61 MMHG

## 2020-12-15 VITALS — DIASTOLIC BLOOD PRESSURE: 73 MMHG | SYSTOLIC BLOOD PRESSURE: 123 MMHG

## 2020-12-15 VITALS — SYSTOLIC BLOOD PRESSURE: 104 MMHG | DIASTOLIC BLOOD PRESSURE: 64 MMHG

## 2020-12-15 VITALS — SYSTOLIC BLOOD PRESSURE: 96 MMHG | DIASTOLIC BLOOD PRESSURE: 62 MMHG

## 2020-12-15 VITALS — SYSTOLIC BLOOD PRESSURE: 115 MMHG | DIASTOLIC BLOOD PRESSURE: 61 MMHG

## 2020-12-15 VITALS — DIASTOLIC BLOOD PRESSURE: 58 MMHG | SYSTOLIC BLOOD PRESSURE: 95 MMHG

## 2020-12-15 VITALS — DIASTOLIC BLOOD PRESSURE: 63 MMHG | SYSTOLIC BLOOD PRESSURE: 103 MMHG

## 2020-12-15 VITALS — DIASTOLIC BLOOD PRESSURE: 57 MMHG | SYSTOLIC BLOOD PRESSURE: 91 MMHG

## 2020-12-15 VITALS — SYSTOLIC BLOOD PRESSURE: 95 MMHG | DIASTOLIC BLOOD PRESSURE: 49 MMHG

## 2020-12-15 VITALS — SYSTOLIC BLOOD PRESSURE: 99 MMHG | DIASTOLIC BLOOD PRESSURE: 58 MMHG

## 2020-12-15 VITALS — SYSTOLIC BLOOD PRESSURE: 95 MMHG | DIASTOLIC BLOOD PRESSURE: 60 MMHG

## 2020-12-15 VITALS — DIASTOLIC BLOOD PRESSURE: 65 MMHG | SYSTOLIC BLOOD PRESSURE: 120 MMHG

## 2020-12-15 VITALS — SYSTOLIC BLOOD PRESSURE: 102 MMHG | DIASTOLIC BLOOD PRESSURE: 62 MMHG

## 2020-12-15 VITALS — SYSTOLIC BLOOD PRESSURE: 90 MMHG | DIASTOLIC BLOOD PRESSURE: 59 MMHG

## 2020-12-15 VITALS — DIASTOLIC BLOOD PRESSURE: 63 MMHG | SYSTOLIC BLOOD PRESSURE: 101 MMHG

## 2020-12-15 VITALS — SYSTOLIC BLOOD PRESSURE: 107 MMHG | DIASTOLIC BLOOD PRESSURE: 65 MMHG

## 2020-12-15 VITALS — SYSTOLIC BLOOD PRESSURE: 133 MMHG | DIASTOLIC BLOOD PRESSURE: 83 MMHG

## 2020-12-15 VITALS — DIASTOLIC BLOOD PRESSURE: 64 MMHG | SYSTOLIC BLOOD PRESSURE: 97 MMHG

## 2020-12-15 VITALS — SYSTOLIC BLOOD PRESSURE: 117 MMHG | DIASTOLIC BLOOD PRESSURE: 60 MMHG

## 2020-12-15 VITALS — DIASTOLIC BLOOD PRESSURE: 61 MMHG | SYSTOLIC BLOOD PRESSURE: 104 MMHG

## 2020-12-15 VITALS — SYSTOLIC BLOOD PRESSURE: 93 MMHG | DIASTOLIC BLOOD PRESSURE: 60 MMHG

## 2020-12-15 VITALS — DIASTOLIC BLOOD PRESSURE: 62 MMHG | SYSTOLIC BLOOD PRESSURE: 101 MMHG

## 2020-12-15 VITALS — SYSTOLIC BLOOD PRESSURE: 98 MMHG | DIASTOLIC BLOOD PRESSURE: 61 MMHG

## 2020-12-15 VITALS — DIASTOLIC BLOOD PRESSURE: 55 MMHG | SYSTOLIC BLOOD PRESSURE: 94 MMHG

## 2020-12-15 VITALS — DIASTOLIC BLOOD PRESSURE: 69 MMHG | SYSTOLIC BLOOD PRESSURE: 119 MMHG

## 2020-12-15 VITALS — SYSTOLIC BLOOD PRESSURE: 85 MMHG | DIASTOLIC BLOOD PRESSURE: 55 MMHG

## 2020-12-15 VITALS — SYSTOLIC BLOOD PRESSURE: 88 MMHG | DIASTOLIC BLOOD PRESSURE: 58 MMHG

## 2020-12-15 VITALS — DIASTOLIC BLOOD PRESSURE: 58 MMHG | SYSTOLIC BLOOD PRESSURE: 94 MMHG

## 2020-12-15 VITALS — DIASTOLIC BLOOD PRESSURE: 61 MMHG | SYSTOLIC BLOOD PRESSURE: 101 MMHG

## 2020-12-15 VITALS — DIASTOLIC BLOOD PRESSURE: 60 MMHG | SYSTOLIC BLOOD PRESSURE: 104 MMHG

## 2020-12-15 VITALS — SYSTOLIC BLOOD PRESSURE: 100 MMHG | DIASTOLIC BLOOD PRESSURE: 59 MMHG

## 2020-12-15 VITALS — SYSTOLIC BLOOD PRESSURE: 114 MMHG | DIASTOLIC BLOOD PRESSURE: 85 MMHG

## 2020-12-15 VITALS — SYSTOLIC BLOOD PRESSURE: 113 MMHG | DIASTOLIC BLOOD PRESSURE: 57 MMHG

## 2020-12-15 VITALS — DIASTOLIC BLOOD PRESSURE: 66 MMHG | SYSTOLIC BLOOD PRESSURE: 108 MMHG

## 2020-12-15 VITALS — DIASTOLIC BLOOD PRESSURE: 58 MMHG | SYSTOLIC BLOOD PRESSURE: 101 MMHG

## 2020-12-15 VITALS — DIASTOLIC BLOOD PRESSURE: 65 MMHG | SYSTOLIC BLOOD PRESSURE: 116 MMHG

## 2020-12-15 VITALS — DIASTOLIC BLOOD PRESSURE: 66 MMHG | SYSTOLIC BLOOD PRESSURE: 110 MMHG

## 2020-12-15 VITALS — DIASTOLIC BLOOD PRESSURE: 60 MMHG | SYSTOLIC BLOOD PRESSURE: 103 MMHG

## 2020-12-15 VITALS — SYSTOLIC BLOOD PRESSURE: 97 MMHG | DIASTOLIC BLOOD PRESSURE: 63 MMHG

## 2020-12-15 VITALS — SYSTOLIC BLOOD PRESSURE: 114 MMHG | DIASTOLIC BLOOD PRESSURE: 55 MMHG

## 2020-12-15 LAB
ALBUMIN SERPL BCG-MCNC: 1.6 GM/DL (ref 3.2–5.2)
ALT SERPL W P-5'-P-CCNC: 15 U/L (ref 12–78)
ANISOCYTOSIS BLD QL SMEAR: (no result)
BASE EXCESS BLDA CALC-SCNC: -4.2 MMOL/L (ref -2–2)
BILIRUB SERPL-MCNC: 0.5 MG/DL (ref 0.2–1)
BUN SERPL-MCNC: 63 MG/DL (ref 7–18)
BUN SERPL-MCNC: 73 MG/DL (ref 7–18)
CALCIUM SERPL-MCNC: 7.3 MG/DL (ref 8.8–10.2)
CALCIUM SERPL-MCNC: 7.7 MG/DL (ref 8.8–10.2)
CHLORIDE SERPL-SCNC: 101 MEQ/L (ref 98–107)
CHLORIDE SERPL-SCNC: 102 MEQ/L (ref 98–107)
CHOLEST SERPL-MCNC: 131 MG/DL (ref ?–200)
CK SERPL-CCNC: 96 U/L (ref 39–308)
CO2 BLDA CALC-SCNC: 22 MEQ/L (ref 23–31)
CO2 SERPL-SCNC: 21 MEQ/L (ref 21–32)
CO2 SERPL-SCNC: 26 MEQ/L (ref 21–32)
CREAT SERPL-MCNC: 4.51 MG/DL (ref 0.7–1.3)
CREAT SERPL-MCNC: 5.76 MG/DL (ref 0.7–1.3)
GFR SERPL CREATININE-BSD FRML MDRD: 10.1 ML/MIN/{1.73_M2} (ref 35–?)
GFR SERPL CREATININE-BSD FRML MDRD: 13.4 ML/MIN/{1.73_M2} (ref 35–?)
GLUCOSE SERPL-MCNC: 112 MG/DL (ref 70–100)
GLUCOSE SERPL-MCNC: 119 MG/DL (ref 70–100)
HCO3 BLDA-SCNC: 20.9 MEQ/L (ref 22–26)
HCO3 STD BLDA-SCNC: 21 MEQ/L (ref 22–26)
HCT VFR BLD AUTO: 34.4 % (ref 42–52)
HCT VFR BLD AUTO: 35.6 % (ref 42–52)
HGB BLD-MCNC: 11 G/DL (ref 13.5–17.5)
HGB BLD-MCNC: 11.5 G/DL (ref 13.5–17.5)
LDH SERPL L TO P-CCNC: 582 U/L (ref 87–241)
LYMPHOCYTES NFR BLD MANUAL: 2 % (ref 16–44)
MAGNESIUM SERPL-MCNC: 1.6 MG/DL (ref 1.8–2.4)
MCH RBC QN AUTO: 28.4 PG (ref 27–33)
MCH RBC QN AUTO: 28.8 PG (ref 27–33)
MCHC RBC AUTO-ENTMCNC: 32 G/DL (ref 32–36.5)
MCHC RBC AUTO-ENTMCNC: 32.3 G/DL (ref 32–36.5)
MCV RBC AUTO: 88.9 FL (ref 80–96)
MCV RBC AUTO: 89 FL (ref 80–96)
MONOCYTES NFR BLD MANUAL: 2 % (ref 0–5)
NEUTROPHILS NFR BLD MANUAL: 83 % (ref 28–66)
PCO2 BLDA: 38.3 MMHG (ref 35–45)
PH BLDA: 7.35 UNITS (ref 7.35–7.45)
PHOSPHATE SERPL-MCNC: 4.6 MG/DL (ref 2.5–4.9)
PHOSPHATE SERPL-MCNC: 6.1 MG/DL (ref 2.5–4.9)
PLATELET # BLD AUTO: 331 10^3/UL (ref 150–450)
PLATELET # BLD AUTO: 393 10^3/UL (ref 150–450)
PLATELET BLD QL SMEAR: NORMAL
PO2 BLDA: 85.3 MMHG (ref 75–100)
POTASSIUM SERPL-SCNC: 4.7 MEQ/L (ref 3.5–5.1)
POTASSIUM SERPL-SCNC: 5.2 MEQ/L (ref 3.5–5.1)
PROT SERPL-MCNC: 5.3 GM/DL (ref 6.4–8.2)
RBC # BLD AUTO: 3.87 10^6/UL (ref 4.3–6.1)
RBC # BLD AUTO: 4 10^6/UL (ref 4.3–6.1)
SAO2 % BLDA: 96.6 % (ref 95–99)
SODIUM SERPL-SCNC: 134 MEQ/L (ref 136–145)
SODIUM SERPL-SCNC: 136 MEQ/L (ref 136–145)
T3FREE SERPL-MCNC: < 0.5 PG/ML (ref 2.2–4)
T4 FREE SERPL-MCNC: 0.35 NG/DL (ref 0.76–1.46)
TOXIC GRANULES BLD QL SMEAR: (no result)
TRIGL SERPL-MCNC: 140 MG/DL (ref ?–150)
TSH SERPL DL<=0.005 MIU/L-ACNC: 17.7 UIU/ML (ref 0.36–3.74)
WBC # BLD AUTO: 11 10^3/UL (ref 4–10)
WBC # BLD AUTO: 19.7 10^3/UL (ref 4–10)

## 2020-12-15 PROCEDURE — 06HM33Z INSERTION OF INFUSION DEVICE INTO RIGHT FEMORAL VEIN, PERCUTANEOUS APPROACH: ICD-10-PCS | Performed by: INTERNAL MEDICINE

## 2020-12-15 PROCEDURE — 5A1D90Z PERFORMANCE OF URINARY FILTRATION, CONTINUOUS, GREATER THAN 18 HOURS PER DAY: ICD-10-PCS | Performed by: INTERNAL MEDICINE

## 2020-12-15 RX ADMIN — IPRATROPIUM BROMIDE AND ALBUTEROL SULFATE SCH ML: .5; 3 SOLUTION RESPIRATORY (INHALATION) at 11:32

## 2020-12-15 RX ADMIN — OMEPRAZOLE SCH MG: 20 CAPSULE, DELAYED RELEASE ORAL at 08:48

## 2020-12-15 RX ADMIN — DEXTROSE MONOHYDRATE SCH MLS/HR: 50 INJECTION, SOLUTION INTRAVENOUS at 22:00

## 2020-12-15 RX ADMIN — IPRATROPIUM BROMIDE AND ALBUTEROL SULFATE SCH ML: .5; 3 SOLUTION RESPIRATORY (INHALATION) at 03:50

## 2020-12-15 RX ADMIN — IPRATROPIUM BROMIDE AND ALBUTEROL SULFATE SCH ML: .5; 3 SOLUTION RESPIRATORY (INHALATION) at 23:00

## 2020-12-15 RX ADMIN — LEVOTHYROXINE SODIUM SCH MCG: 50 TABLET ORAL at 15:31

## 2020-12-15 RX ADMIN — ENOXAPARIN SODIUM SCH MG: 80 INJECTION SUBCUTANEOUS at 20:27

## 2020-12-15 RX ADMIN — OXYCODONE HYDROCHLORIDE AND ACETAMINOPHEN SCH MG: 500 TABLET ORAL at 08:48

## 2020-12-15 RX ADMIN — MULTIPLE VITAMINS W/ MINERALS TAB SCH TAB: TAB at 08:48

## 2020-12-15 RX ADMIN — MAGNESIUM SULFATE IN DEXTROSE SCH MLS/HR: 10 INJECTION, SOLUTION INTRAVENOUS at 20:26

## 2020-12-15 RX ADMIN — DEXTROSE AND SODIUM CHLORIDE SCH MLS/HR: 10; .2 INJECTION, SOLUTION INTRAVENOUS at 21:23

## 2020-12-15 RX ADMIN — DEXTROSE AND SODIUM CHLORIDE SCH MLS/HR: 10; .2 INJECTION, SOLUTION INTRAVENOUS at 22:53

## 2020-12-15 RX ADMIN — IPRATROPIUM BROMIDE AND ALBUTEROL SULFATE SCH ML: .5; 3 SOLUTION RESPIRATORY (INHALATION) at 19:17

## 2020-12-15 RX ADMIN — OMEPRAZOLE SCH MG: 20 CAPSULE, DELAYED RELEASE ORAL at 20:26

## 2020-12-15 RX ADMIN — POLYETHYLENE GLYCOL 3350 SCH PKT: 17 POWDER, FOR SOLUTION ORAL at 08:47

## 2020-12-15 RX ADMIN — DIGOXIN SCH MG: 0.25 INJECTION INTRAMUSCULAR; INTRAVENOUS at 23:42

## 2020-12-15 RX ADMIN — HYDROCORTISONE SODIUM SUCCINATE SCH MG: 100 INJECTION, POWDER, FOR SOLUTION INTRAMUSCULAR; INTRAVENOUS at 22:10

## 2020-12-15 RX ADMIN — HYDROCORTISONE SODIUM SUCCINATE SCH MG: 100 INJECTION, POWDER, FOR SOLUTION INTRAMUSCULAR; INTRAVENOUS at 15:31

## 2020-12-15 RX ADMIN — MAGNESIUM SULFATE IN DEXTROSE SCH MLS/HR: 10 INJECTION, SOLUTION INTRAVENOUS at 19:12

## 2020-12-15 NOTE — CCN
CRITICAL CARE NOTE



DATE:  12/14/2020



SUBJECTIVE: I was called to see this 80-year-old  male for hypoxemic

respiratory failure admitted about 24 hours ago for an altered level of

consciousness. He has a history of metastatic cancer. He had a pleural drainage

procedure today and was found to be in acute renal failure with a creatinine of

5.3. His past medical history includes hypertension and metastatic

carcinomatosis of the mesentery, adenocarcinoma of unknown primary. 



OBJECTIVE:

GENERAL APPEARANCE: At bedside, he is ill-appearing and tachypneic. 

VITAL SIGNS: His temperature is 97.9, pulse rate 105, respirations 22, blood

pressure 90/48. 

HEENT: Oral and nasal mucosa are pink. His neck is supple. Trachea is in the

midline. There is no stridor. 

HEART: Sounds are regular.

LUNGS: Rapid breath sounds, diminished with crepitants rales bilaterally. 

CHEST: Symmetric. 

ABDOMEN: Soft and somewhat distended. 

EXTREMITIES: Cool. Pulses diminished. 



DIAGNOSTIC STUDIES: His white cell count is 10.9, hemoglobin 11.3, hematocrit

35.4, platelet count 390,000 from labs done at 2:30 this morning. His

electrolytes are sodium 133, potassium 5.3, chloride 98, CO2 of 25, BUN 71,

creatinine 5.33, glucose 98, calcium 7.7, bilirubin 0.3, AST 47, ALT 16. LDH

was elevated at 556. His albumin is 1.8. Troponin was less than 0.06. His TSH

was 25. Ammonia was less than 10. Lactic acid was 1. 



Arterial blood gases show a pH of 7.36, pCO2 of 32, pO2 of 56, sat 89% on 100%

oxygen. PT 16, PTT 30, INR 1.29. 



The pleural fluid was exudative. 



Chest x-ray shows improvement in the left effusion and interstitial edema on

the right. Formal report is pending. 



Telemetry monitor shows sinus tachycardia with PVCs. 



ASSESSMENT AND PLAN: The primarily problem requiring critical attention is

acute hypoxic respiratory failure of unclear etiology. Recent testing would

suggest otherwise, but I would repeat his COVID study given the rapid onset of

hypoxemia and interstitial infiltrates on the x-ray. We will initiate

noninvasive positive pressure ventilation and follow his gas exchange.



Pulmonary edema:  BNP has been ordered. This is either cardiogenic or

noncardiogenic. We will also ask for an EKG to be performed. 



Carcinomatosis:  This is an adenocarcinoma of unknown primary. He has had no

treatment thus far. Prognosis is poor. 



Acute kidney failure:  I agree with involving nephrology for assistance with

optimal management. 



I have discussed the case with the attending physician and the intensive care

unit (ICU) team. We will facilitate transfer now to the intensive care unit for

noninvasive ventilation. The patient's condition is critical. Prognosis is

guarded.



One hour and 16 minutes was spent in the provision of bedside critical care and

coordination excluding any procedure time. 

LOU

## 2020-12-15 NOTE — RO
OPERATIVE NOTE



DATE OF OPERATION:  12/15/2020



INDICATIONS FOR PROCEDURE: Acute renal failure and respiratory failure.



PROCEDURE: Right femoral vein hemodialysis catheter placement.



PREPROCEDURE DIAGNOSES: 

1.  Acute renal failure.

2.  Respiratory failure.



POSTPROCEDURE DIAGNOSES:

1.  Acute renal failure.

2.  Respiratory failure. 



SURGEON:  Amina Madrigal MD 



ANESTHESIA: 1% Lidocaine for local anesthesia. 



DESCRIPTION OF PROCEDURE: The patient has oliguric acute renal failure and

respiratory failure. He is currently on pressors and is in need for urgent

dialysis. Procedure was explained to the patient and informed consent obtained.

All of his questions were answered.



The right femoral area is shaved, cleaned, and prepped in the usual sterile

fashion. 1% Lidocaine used for local anesthesia. Right femoral vein accessed

without any difficulty with first attempt and guidewire advanced without any

problem. The needle was removed and a small incision made at the site of the

guidewire. The skin and soft tissue were dilated with the skin dilator. Double

lumen hemodialysis catheter placed over the guidewire without any difficulty

and good venous blood return obtained from both ports. Catheter sutured in site

and dressing applied. The patient tolerated the procedure very well. Estimated

blood loss was about 7 mL. There were no immediate complications.

## 2020-12-15 NOTE — IPNPDOC
Text Note


Date of Service


The patient was seen on 12/15/20.





NOTE


Patient was seen and examined this morning in the ICU. The patient had a sudden 

deterioration yesterday. He became acutely short of breath with bibasilar 

crackles and is requiring BiPAP at this time. The patient also became slightly 

hypotensive last night and has been put on Levophed at a very minimal dose. The 

patient continues to have very low urine output and after consultation with 

nephrology. The patient probably will be started on CRRT today.








Physical examination


GENERAL APPEARANCE: Laying flat in bed awake, on BiPAP in no apparent distress


HEENT: Normocephalic, atraumatic, symmetric, EOMI, PERRLA, mucous membranes 

moist.


CARDIOVASCULAR: Regular rate and rhythm without appreciable murmur/ rub or 

gallop


LUNGS: Lung sounds diminished in the bases bilaterally, more so on the left than

the right. Otherwise good air movement with symmetric chest rise. No appreciable

wheezing or rhonchi


ABDOMEN: Distended, round, fluid thrill present. No guarding no rigidity.


MUSCULOSKELETAL: Patient is able to move all extremities equally bilaterally


EXTREMITIES: No edema No calf tenderness


NEUROLOGICAL: Strength 5 out of 5 in upper and lower extremities. No facial 

droop, aphasia or dysarthria. Sensorium intact


PSYCHIATRIC: Mood and affect are appropriate given patient's current medical 

conditions.





Labs: Reviewed





Radiology : Reviewed.





Assessment and plan





Patient is an 80-year-old male, past medical history significant for recently 

diagnosed metastatic disease of unknown primary source with omental  and 

mesenteric deposits with Ascites. He presented to the emergency department the 

evening on 12/13/20 with alteration in mental status. As per him, he took his p

ain medication, oxycodone, and after that was confused and as per his wife was 

talking to somebody which was not even in the room. On my encounter. He states 

that he does not remember anything like that. He has been following up with 

hematology oncology and they have scheduled port placement and the workup for 

the primary. He got a CT scan of the head which showed no acute pathology and an

x-ray of the lungs showed increasing effusion in the left lung which has been 

more than noticed previously. He also has a distended abdomen with a fluid 

thrill. He was also found to have in acute kidney injury with a creatinine of 

5.3 and potassium of 5.3 Currently. . He had sudden deterioration last night 

where he became more hypoxic with possible picture of pulmonary edema likely 

secondary to fluid resuscitation in view of oliguria/anurea. . Currently, he has

been on BiPAP and has been transferred to ICU. Because of his worsening renal 

function without any improvement. Nephrology has been consulted and he will be 

started on CRRT. He also has a low blood pressures for which she has been put on

Levophed. He does not look septic or there is no other sign of infection and for

that reason. No antibiotics have been started. There could be a possibility of 

adrenal insufficiency as the patient has multiple metastatic disease, which 

could have involved a detailed and for that reason, the patient will be getting 

a random cortisol as well as a.m. cortisol. I'm going to start him on 

hydrocortisone 50 every 8 hours.





1. Alteration in mental status. Under evaluation could be multifactorial right 

earache related to uremia because of AK I with a creatinine of 5.3 and 

polypharmacy with oxycodone. CT scan of the head is negative other metabolic 

causes have been ruled out. Infectious workup has been started. We will address 

individual cause.





2. Acute kidney injury. Worsening. The patient is not providing much history but

states that he has been taking Aleve but does not quantify how much for the last

couple of weeks. This could be a reason of his acute kidney injury. He got fluid

resuscitation, but that led to pulmonary edema and for that reason, nephrology 

has been consulted and the patient has been started on CRRT given his low blood 

pressure. Input output monitoring. Avoid any nephrotoxic drugs.





3.  Acute hypoxic respiratory failure. Likely secondary to fluid resuscitation, 

in view of all the urea/anurea. Nephrology has been consulted and they are 

planning for a CRRT today. Continue BiPAP and titrate according to the 

saturations. Pulmonary/intensivist service is already on board.





4. Hypotension. Troponins have been negative and most likely this is not a 

cardiogenic shock. The patient will be getting a 2-D echo as well. The patient 

continues to be on Levophed. Given his metastatic disease. This could be adrenal

insufficiency and for that reason, the patient will be getting a random cortisol

as well as 8 AM cortisol. A stress dose of hydrocortisone will be given to him 

and if the random cortisol is normal, then we can discontinue his 

hydrocortisone. No other obvious reason of hypotension is seen. The patient does

not seem any signs of sepsis. He does have ascites but the abdomen is nontender 

and the likelihood of SBP is very low. In any case, once he is hemodynamically 

stable. The patient would benefit from a paracentesis to rule out SBP.





5. Metastatic cancer with Omental/Peritoneal Mets, unknown primary : There were 

malignant cells in the ascites however immunohistochemistry could not make a 

definitive diagnosis from the cells due to the paucity of the cells. . He has a 

Dr. Bui as a his oncologist and will follow up with him once ACUTE issues 

are resolved. 





6. Recurrent pleural effusions. He has a large left-sided pleural effusion. 

Patient had a thoracocentesis on 12/14/20 1800 mL was drained from the left 

side. Continue monitoring. The patient has transited effusion last time. Fluid 

analysis has been sent for this effusion.





7. Hyperkalemia. Related to acute kidney injury. We will hold any nephrotoxic 

drugs and monitor. CRRT has been started. Nephrology following.





8. Ascites. Likely secondary to omental and peritoneal metastasis. We will get 

paracentesis if required.





9. Esophageal stenosis : S/P endoscopic evaluation on 11/17 by Dr. Quezada.  

Biopsy did not show any malignancy . Continue PPI





10. Duodenal ulcer and erossive gastropathy seen in EGD on 11/17/20. Duodenal 

mucosa with extensive ulceration and reparative changes. Negative for 

malignancy. Continue PPI





DVT PROPHYLAXIS:: Lovenox





CODE STATUS: Patient confirms DNR/trial of intubation





VS,Fishbone, I+O


VS, Fishbone, I+O


Laboratory Tests


12/14/20 13:48








12/14/20 20:13








12/15/20 03:59











Vital Signs








  Date Time  Temp Pulse Resp B/P (MAP) Pulse Ox O2 Delivery O2 Flow Rate FiO2


 


12/15/20 09:00  101 20 95/55 (68) 88 Nasal Cannula 5.0 


 


12/15/20 08:00 98.3       


 


12/15/20 06:00        100














I&O- Last 24 Hours up to 6 AM 


 


 12/15/20





 06:00


 


Intake Total 781 ml


 


Output Total 625 ml


 


Balance 156 ml

















JOSE ELIAS YU MD             Dec 15, 2020 13:07

## 2020-12-15 NOTE — CR
NEPHROLOGY CONSULTATION

DATE: 12/15/2020



REQUESTING PHYSICIAN: Elmer Cash M.D.



REASON FOR CONSULTATION:  Acute renal failure.



HISTORY OF PRESENT ILLNESS:  Mr. Childers is an 80-year-old gentleman with a known

history of metastatic cancer without primary known. He has carcinomatosis with

abdominal distention and also had a large left pleural effusion. He was

admitted to [facility] on December 14th due to hallucinations and

was found to have acute renal failure with serum creatinine of about 5 while

his baseline creatinine has been about 1.0. Hospitalist Service initially tried

hydration as it was felt that the patient may have dehydration causing acute

renal failure. The patient had a left pleuracentesis done and 1,800 mL fluid

was removed following which he developed worsening dyspnea and a chest x-ray

showed alveolar fluid. He had acute pulmonary edema following pleuracentesis

due to which he has been placed on BIPAP. The patient also has now developed

oliguric acute renal failure. A Nephrology consultation was requested last

evening and the patient is seen this morning at his bedside. 



PAST MEDICAL HISTORY:  The patient's past medical history is significant for:

1.  Metastatic cancer without known of his primary cancer site.

2.  History of pulmonary embolism - currently on Eliquis. 

3.  History of esophageal stricture.

4.  Hypertension. 

5.  History of right hip osteoarthritis.



PAST SURGICAL HISTORY:  The patient's past surgical history is significant for:

1.  Colon polypectomy.

2.  Bilateral cataract surgery.

3.  Right total hip replacement.

4.  Bilateral carpal tunnel release.

5.  Laparoscopy with biopsy of peritoneal tumor in November 2020.



HOME MEDICATIONS:  His home medications include:

1.  Eliquis 5 mg twice daily.

2.  Vitamin C 500 mg twice daily.

3.  Furosemide 40 mg daily. 

4.  Multivitamin one tablet daily. 

5.  Omeprazole 40 mg twice daily.

6.  Miralax 17 grams daily. 

7.  He also uses Oxycodone as needed for pain

8.  Zofran 4 mg as needed for nausea. 



ALLERGIES:  He has no known drug allergies.



PERSONAL & SOCIAL HISTORY:  The patient is  and lives with his wife. He

currently does not smoke. There is no history of alcohol or drug use. 



FAMILY HISTORY:  Father had lung cancer. Mother with colon cancer. 



REVIEW OF SYSTEMS: The patient is currently on BIPAP and still able to talk.

Apparently he had hallucinations that were the cause of his admission. No fever

or chills reported. There is no history of headache, recent stroke, ear, nose

or throat problems. Cardiovascular system is significant for sudden pulmonary

edema following left thoracentesis requiring BIPAP. Respiratory system

significant for left pleural effusion, status post thoracentesis and prior

history of pulmonary embolism. GI system is negative for vomiting or diarrhea.

He has carcinomatosis with abdominal ascites. Genitourinary system is

significant for minimal urine output. He currently has a Lemons catheter in

place. Endocrine system negative for diabetes or thyroid problems.

Hematological system is significant for anemia. Psychosocial system is

significant for hallucinations without any prior history of psych problems.

Musculoskeletal system is negative for any leg edema. He does have degenerative

arthritis. 



PHYSICAL EXAMINATION:

GENERAL APPEARANCE: This is an elderly gentleman who is currently on BIPAP.

VITAL SIGNS: Temperature is 98.3 degrees Fahrenheit, heart rate about 100 per

minute, and respiratory rate 20 per minute, blood pressure 108/59 mm of mercury

and oxygen saturation about 90%.

HEENT: Head is atraumatic. He is currently on the BIPAP and I did not remove

the mask. 

NECK: Veins are difficult to be assessed. 

HEART: Tachycardic.

LUNGS: Bibasilar rales. 

ABDOMEN: Distended with ascites and bowel sounds are present. 

EXTREMITIES:  Without any cyanosis or clubbing.

NEUROLOGICALLY: The patient is awake and able to answer questions. He has no

focal neurological deficits.



LABORATORY DATA:  Today's labs show a WBC count 11.0, hemoglobin 11.5 and

hematocrit 35.6, platelets 393.



Sodium 134, potassium 5.2, CO2 21, BUN 73 and creatinine 5.76, glucose 119 and

calcium 7.3. A BNP level was 11,005 yesterday evening. TSH level 17.7 and free

T-4 0.35. Free T-3 is less than 0.05. 



Urinalysis showed only 1+ protein and 1+ blood with 6 WBCs and 4 RBCs. 



PROBLEMS:  

1.  Acute renal failure - The patient had a serum creatinine of 1.0 back in

    November 2020. Acute renal failure is of uncertain etiology. At this point

    he is oliguric and has a Lemons catheter in place. He was given IV fluids,

    however he went into pulmonary edema following thoracentesis. At this point

    we would not want to give him IV fluids, and we will arrange urgent

    dialysis. I have discussed with the patient about the potential need for

    dialysis, and he has consented for it. He has also consented for dialysis

    catheter placement. 

2.  Respiratory failure  this related to acute pulmonary edema. I would

    recommend a CT scan of the chest with IV contrast in view of his history of

    carcinomatosis. 

3.  He remains on BIPAP. We will try to remove some fluid with CRRT if he could

    tolerate.

4.  Hypotension - The patient is hypotensive and currently on a low dose of

    Levophed. I would recommend broad spectrum antibiotic coverage for possible

    bacteremia. 

5.  Hyperkalemia  The patient has mild hyperkalemia which will be corrected

    with CRRT. 



PLAN: 

I have explained to the patient about his prognosis and he understands that he

has metastatic cancer and now has acute renal failure requiring dialysis. He

did consent and wish to get everything done for now. We will make arrangements

for CRRT which will be started within the next few hours.



I have answered all his questions. 



Thank you for involving me in the care of Mr. Childers. Our Nephrology Service

will follow him along with you.

## 2020-12-15 NOTE — ECGEPIP
[facility] - ED

                                       

                                       Test Date:    2020

Pat Name:     CAROLINE JARVIS             Department:   

Patient ID:   [id number]                 Room:         [place] [address]

Gender:       Male                     Technician:   [facility]

:          1940               Requested By: DI JACOB

Order Number: [id number]     Reading MD:   Reinaldo Jerome

                                 Measurements

Intervals                              Axis          

Rate:         90                       P:            32

MT:           140                      QRS:          41

QRSD:         117                      T:            -9

QT:           330                                    

QTc:          404                                    

                           Interpretive Statements

SINUS RHYTHM

INCOMPLETE RIGHT BUNDLE BRANCH BLOCK

POOR R WAVE PROGRESSION

SIMILAR TO 20

Electronically Signed on 12- 5:33:11 EST by Reinaldo Jerome

## 2020-12-15 NOTE — REP
INDICATION:

pulm edema.



COMPARISON:

Multiple views on 12/14/2020.



TECHNIQUE:

Single AP view of the chest performed portably with the patient upright.



FINDINGS:

According to the phone follow up patient underwent left thoracentesis on 12/14/2020.

Additionally the patient had a central line placed on 12/14/2020.



There are bibasilar infiltrates, not significantly changed.

The left pleural effusion has significantly decreased, however, there is persistent

increased density in the left costophrenic angle suggesting there may be a small

volume of persisting left pleural effusion.

There is a right subclavian central venous catheter with the tip in the right atrium

in satisfactory position, unchanged.  There is no right pneumothorax or hemothorax.

Cardiac size is normal.

The fabián are obscured by superimposed infiltrates.  The mediastinum is unremarkable.

There is bilateral shoulder osteoarthritis, unchanged.





IMPRESSION:

Bibasilar infiltrates.

Probable persisting small volume of left pleural fluid.

Right subclavian central venous catheter.  No right pneumothorax or hemothorax.

Bilateral shoulder osteoarthritis.





<Electronically signed by Humberto Carrillo > 12/15/20 0751

## 2020-12-15 NOTE — CCN
CRITICAL CARE NOTE



DATE:  12/15/2020



The patient is seen in the intensive care unit on non-invasive ventilation,

required Levophed for hypotension late in the night, his oxygen saturations

have improved slightly.



At bedside, his temperature is 97, pulse rate 92, respirations 27, blood

pressure 101/62 on Levophed.  Intake and output for the past 24 hours:  643 in,

390 out, since midnight 138 in, 235 out.  He is awake and interactive,

ill-appearing, cachectic.  Oral mucosa is dry.  Neck is supple, no meningismus.

 Heart sounds are regular, somewhat distant.  Breath sounds diminished

bilaterally, coarse rales are appreciated.  Abdomen is soft and the extremities

are cool, pulses are diminished.  



DIAGNOSTIC STUDIES:  

White cell count is up at 11, hemoglobin 11.5, hematocrit 35.6, platelet count

393,000.  Differential white cell count shows 83% neutrophils and 13% bands. 

Electrolytes are sodium 134, potassium is up to 5.2, chloride 101, CO2 21, BUN

73, creatinine 5.76, glucose 119, calcium 7.3, phosphorous 6.1, AST 43, ALT 15,

his LDH is 582, brain natriuretic peptide 11,000, albumin 1.6.



Arterial blood gases showed a pH of 7.35, pCO2 38, pO2 85 on non-invasive

ventilation, 100% oxygen.  



His PT is 15, PTT 27, D-dimer greater than 4000.



Chest imaging shows a right-sided infiltrate and decreased left effusion.



COVID testing was negative.



The primary problems requiring critical attention is:

1.  Acute hypoxic respiratory failure.  The patient has responded to

non-invasive ventilation.  The etiology of his abrupt change is unclear, it

occurred shortly after drainage of his left pleural effusion.  COVID testing

was negative.  Ultrasound studies of his legs are negative, however his risk

for pulmonary embolism is quite high with diffuse malignancy and I agree with

full-dose Lovenox. 



2.  Shock.  The patient is responding to Levophed at mid-dose.  We are

targeting a mean arterial pressure of 65.



3.  Congestive heart failure.  Brain natriuretic peptide of 11,000.  The

patient had no response to high doses of Lasix.  We are awaiting nephrology's

consultation.



4.  Acute kidney injury.  Creatinine is very elevated and he has had little

urine output.  Await nephrology consultation.



5.  Metastatic adenocarcinoma with diffuse mesenteric metastases.  The patient

is aware of the overall poor prognosis.



The patient's condition is critical.  Prognosis is guarded. 



1 hour and 15 minutes was spent in the provision of bedside critical care and

coordination excluding procedure time.

LOU

## 2020-12-16 VITALS — DIASTOLIC BLOOD PRESSURE: 67 MMHG | SYSTOLIC BLOOD PRESSURE: 117 MMHG

## 2020-12-16 VITALS — SYSTOLIC BLOOD PRESSURE: 112 MMHG | DIASTOLIC BLOOD PRESSURE: 59 MMHG

## 2020-12-16 VITALS — DIASTOLIC BLOOD PRESSURE: 63 MMHG | SYSTOLIC BLOOD PRESSURE: 84 MMHG

## 2020-12-16 VITALS — DIASTOLIC BLOOD PRESSURE: 56 MMHG | SYSTOLIC BLOOD PRESSURE: 93 MMHG

## 2020-12-16 VITALS — SYSTOLIC BLOOD PRESSURE: 99 MMHG | DIASTOLIC BLOOD PRESSURE: 54 MMHG

## 2020-12-16 VITALS — DIASTOLIC BLOOD PRESSURE: 52 MMHG | SYSTOLIC BLOOD PRESSURE: 85 MMHG

## 2020-12-16 VITALS — DIASTOLIC BLOOD PRESSURE: 53 MMHG | SYSTOLIC BLOOD PRESSURE: 82 MMHG

## 2020-12-16 VITALS — DIASTOLIC BLOOD PRESSURE: 59 MMHG | SYSTOLIC BLOOD PRESSURE: 104 MMHG

## 2020-12-16 VITALS — SYSTOLIC BLOOD PRESSURE: 89 MMHG | DIASTOLIC BLOOD PRESSURE: 61 MMHG

## 2020-12-16 VITALS — DIASTOLIC BLOOD PRESSURE: 57 MMHG | SYSTOLIC BLOOD PRESSURE: 88 MMHG

## 2020-12-16 VITALS — SYSTOLIC BLOOD PRESSURE: 103 MMHG | DIASTOLIC BLOOD PRESSURE: 52 MMHG

## 2020-12-16 VITALS — SYSTOLIC BLOOD PRESSURE: 108 MMHG | DIASTOLIC BLOOD PRESSURE: 68 MMHG

## 2020-12-16 VITALS — DIASTOLIC BLOOD PRESSURE: 55 MMHG | SYSTOLIC BLOOD PRESSURE: 89 MMHG

## 2020-12-16 VITALS — DIASTOLIC BLOOD PRESSURE: 52 MMHG | SYSTOLIC BLOOD PRESSURE: 78 MMHG

## 2020-12-16 VITALS — DIASTOLIC BLOOD PRESSURE: 50 MMHG | SYSTOLIC BLOOD PRESSURE: 86 MMHG

## 2020-12-16 VITALS — DIASTOLIC BLOOD PRESSURE: 61 MMHG | SYSTOLIC BLOOD PRESSURE: 100 MMHG

## 2020-12-16 VITALS — SYSTOLIC BLOOD PRESSURE: 79 MMHG | DIASTOLIC BLOOD PRESSURE: 64 MMHG

## 2020-12-16 VITALS — SYSTOLIC BLOOD PRESSURE: 92 MMHG | DIASTOLIC BLOOD PRESSURE: 64 MMHG

## 2020-12-16 VITALS — DIASTOLIC BLOOD PRESSURE: 61 MMHG | SYSTOLIC BLOOD PRESSURE: 103 MMHG

## 2020-12-16 VITALS — DIASTOLIC BLOOD PRESSURE: 52 MMHG | SYSTOLIC BLOOD PRESSURE: 126 MMHG

## 2020-12-16 VITALS — DIASTOLIC BLOOD PRESSURE: 73 MMHG | SYSTOLIC BLOOD PRESSURE: 107 MMHG

## 2020-12-16 VITALS — SYSTOLIC BLOOD PRESSURE: 100 MMHG | DIASTOLIC BLOOD PRESSURE: 59 MMHG

## 2020-12-16 VITALS — SYSTOLIC BLOOD PRESSURE: 120 MMHG | DIASTOLIC BLOOD PRESSURE: 56 MMHG

## 2020-12-16 VITALS — SYSTOLIC BLOOD PRESSURE: 114 MMHG | DIASTOLIC BLOOD PRESSURE: 62 MMHG

## 2020-12-16 VITALS — DIASTOLIC BLOOD PRESSURE: 57 MMHG | SYSTOLIC BLOOD PRESSURE: 107 MMHG

## 2020-12-16 VITALS — SYSTOLIC BLOOD PRESSURE: 95 MMHG | DIASTOLIC BLOOD PRESSURE: 57 MMHG

## 2020-12-16 VITALS — DIASTOLIC BLOOD PRESSURE: 61 MMHG | SYSTOLIC BLOOD PRESSURE: 89 MMHG

## 2020-12-16 VITALS — SYSTOLIC BLOOD PRESSURE: 87 MMHG | DIASTOLIC BLOOD PRESSURE: 54 MMHG

## 2020-12-16 VITALS — DIASTOLIC BLOOD PRESSURE: 68 MMHG | SYSTOLIC BLOOD PRESSURE: 97 MMHG

## 2020-12-16 VITALS — SYSTOLIC BLOOD PRESSURE: 118 MMHG | DIASTOLIC BLOOD PRESSURE: 85 MMHG

## 2020-12-16 VITALS — SYSTOLIC BLOOD PRESSURE: 91 MMHG | DIASTOLIC BLOOD PRESSURE: 51 MMHG

## 2020-12-16 VITALS — SYSTOLIC BLOOD PRESSURE: 88 MMHG | DIASTOLIC BLOOD PRESSURE: 53 MMHG

## 2020-12-16 VITALS — DIASTOLIC BLOOD PRESSURE: 67 MMHG | SYSTOLIC BLOOD PRESSURE: 107 MMHG

## 2020-12-16 VITALS — SYSTOLIC BLOOD PRESSURE: 111 MMHG | DIASTOLIC BLOOD PRESSURE: 66 MMHG

## 2020-12-16 VITALS — DIASTOLIC BLOOD PRESSURE: 70 MMHG | SYSTOLIC BLOOD PRESSURE: 101 MMHG

## 2020-12-16 VITALS — DIASTOLIC BLOOD PRESSURE: 48 MMHG | SYSTOLIC BLOOD PRESSURE: 111 MMHG

## 2020-12-16 VITALS — SYSTOLIC BLOOD PRESSURE: 105 MMHG | DIASTOLIC BLOOD PRESSURE: 56 MMHG

## 2020-12-16 VITALS — DIASTOLIC BLOOD PRESSURE: 54 MMHG | SYSTOLIC BLOOD PRESSURE: 93 MMHG

## 2020-12-16 VITALS — SYSTOLIC BLOOD PRESSURE: 89 MMHG | DIASTOLIC BLOOD PRESSURE: 63 MMHG

## 2020-12-16 VITALS — SYSTOLIC BLOOD PRESSURE: 104 MMHG | DIASTOLIC BLOOD PRESSURE: 56 MMHG

## 2020-12-16 VITALS — DIASTOLIC BLOOD PRESSURE: 65 MMHG | SYSTOLIC BLOOD PRESSURE: 93 MMHG

## 2020-12-16 VITALS — DIASTOLIC BLOOD PRESSURE: 53 MMHG | SYSTOLIC BLOOD PRESSURE: 89 MMHG

## 2020-12-16 VITALS — DIASTOLIC BLOOD PRESSURE: 52 MMHG | SYSTOLIC BLOOD PRESSURE: 94 MMHG

## 2020-12-16 VITALS — SYSTOLIC BLOOD PRESSURE: 84 MMHG | DIASTOLIC BLOOD PRESSURE: 53 MMHG

## 2020-12-16 VITALS — SYSTOLIC BLOOD PRESSURE: 88 MMHG | DIASTOLIC BLOOD PRESSURE: 60 MMHG

## 2020-12-16 VITALS — SYSTOLIC BLOOD PRESSURE: 100 MMHG | DIASTOLIC BLOOD PRESSURE: 55 MMHG

## 2020-12-16 VITALS — SYSTOLIC BLOOD PRESSURE: 82 MMHG | DIASTOLIC BLOOD PRESSURE: 53 MMHG

## 2020-12-16 VITALS — SYSTOLIC BLOOD PRESSURE: 87 MMHG | DIASTOLIC BLOOD PRESSURE: 60 MMHG

## 2020-12-16 VITALS — SYSTOLIC BLOOD PRESSURE: 92 MMHG | DIASTOLIC BLOOD PRESSURE: 67 MMHG

## 2020-12-16 VITALS — DIASTOLIC BLOOD PRESSURE: 63 MMHG | SYSTOLIC BLOOD PRESSURE: 109 MMHG

## 2020-12-16 VITALS — DIASTOLIC BLOOD PRESSURE: 62 MMHG | SYSTOLIC BLOOD PRESSURE: 83 MMHG

## 2020-12-16 VITALS — SYSTOLIC BLOOD PRESSURE: 110 MMHG | DIASTOLIC BLOOD PRESSURE: 65 MMHG

## 2020-12-16 VITALS — DIASTOLIC BLOOD PRESSURE: 55 MMHG | SYSTOLIC BLOOD PRESSURE: 108 MMHG

## 2020-12-16 VITALS — SYSTOLIC BLOOD PRESSURE: 108 MMHG | DIASTOLIC BLOOD PRESSURE: 56 MMHG

## 2020-12-16 VITALS — DIASTOLIC BLOOD PRESSURE: 54 MMHG | SYSTOLIC BLOOD PRESSURE: 100 MMHG

## 2020-12-16 VITALS — DIASTOLIC BLOOD PRESSURE: 56 MMHG | SYSTOLIC BLOOD PRESSURE: 94 MMHG

## 2020-12-16 VITALS — SYSTOLIC BLOOD PRESSURE: 88 MMHG | DIASTOLIC BLOOD PRESSURE: 64 MMHG

## 2020-12-16 VITALS — SYSTOLIC BLOOD PRESSURE: 92 MMHG | DIASTOLIC BLOOD PRESSURE: 58 MMHG

## 2020-12-16 VITALS — SYSTOLIC BLOOD PRESSURE: 88 MMHG | DIASTOLIC BLOOD PRESSURE: 54 MMHG

## 2020-12-16 VITALS — SYSTOLIC BLOOD PRESSURE: 103 MMHG | DIASTOLIC BLOOD PRESSURE: 59 MMHG

## 2020-12-16 VITALS — DIASTOLIC BLOOD PRESSURE: 87 MMHG | SYSTOLIC BLOOD PRESSURE: 125 MMHG

## 2020-12-16 VITALS — SYSTOLIC BLOOD PRESSURE: 86 MMHG | DIASTOLIC BLOOD PRESSURE: 51 MMHG

## 2020-12-16 VITALS — SYSTOLIC BLOOD PRESSURE: 103 MMHG | DIASTOLIC BLOOD PRESSURE: 57 MMHG

## 2020-12-16 VITALS — DIASTOLIC BLOOD PRESSURE: 55 MMHG | SYSTOLIC BLOOD PRESSURE: 105 MMHG

## 2020-12-16 VITALS — DIASTOLIC BLOOD PRESSURE: 65 MMHG | SYSTOLIC BLOOD PRESSURE: 108 MMHG

## 2020-12-16 VITALS — DIASTOLIC BLOOD PRESSURE: 64 MMHG | SYSTOLIC BLOOD PRESSURE: 109 MMHG

## 2020-12-16 VITALS — SYSTOLIC BLOOD PRESSURE: 74 MMHG | DIASTOLIC BLOOD PRESSURE: 51 MMHG

## 2020-12-16 VITALS — SYSTOLIC BLOOD PRESSURE: 99 MMHG | DIASTOLIC BLOOD PRESSURE: 63 MMHG

## 2020-12-16 VITALS — DIASTOLIC BLOOD PRESSURE: 77 MMHG | SYSTOLIC BLOOD PRESSURE: 118 MMHG

## 2020-12-16 VITALS — DIASTOLIC BLOOD PRESSURE: 61 MMHG | SYSTOLIC BLOOD PRESSURE: 91 MMHG

## 2020-12-16 VITALS — DIASTOLIC BLOOD PRESSURE: 60 MMHG | SYSTOLIC BLOOD PRESSURE: 107 MMHG

## 2020-12-16 VITALS — DIASTOLIC BLOOD PRESSURE: 52 MMHG | SYSTOLIC BLOOD PRESSURE: 86 MMHG

## 2020-12-16 VITALS — DIASTOLIC BLOOD PRESSURE: 57 MMHG | SYSTOLIC BLOOD PRESSURE: 100 MMHG

## 2020-12-16 VITALS — DIASTOLIC BLOOD PRESSURE: 54 MMHG | SYSTOLIC BLOOD PRESSURE: 96 MMHG

## 2020-12-16 VITALS — DIASTOLIC BLOOD PRESSURE: 62 MMHG | SYSTOLIC BLOOD PRESSURE: 90 MMHG

## 2020-12-16 VITALS — SYSTOLIC BLOOD PRESSURE: 90 MMHG | DIASTOLIC BLOOD PRESSURE: 61 MMHG

## 2020-12-16 VITALS — DIASTOLIC BLOOD PRESSURE: 64 MMHG | SYSTOLIC BLOOD PRESSURE: 106 MMHG

## 2020-12-16 VITALS — DIASTOLIC BLOOD PRESSURE: 62 MMHG | SYSTOLIC BLOOD PRESSURE: 103 MMHG

## 2020-12-16 VITALS — SYSTOLIC BLOOD PRESSURE: 112 MMHG | DIASTOLIC BLOOD PRESSURE: 69 MMHG

## 2020-12-16 VITALS — DIASTOLIC BLOOD PRESSURE: 57 MMHG | SYSTOLIC BLOOD PRESSURE: 96 MMHG

## 2020-12-16 VITALS — SYSTOLIC BLOOD PRESSURE: 94 MMHG | DIASTOLIC BLOOD PRESSURE: 57 MMHG

## 2020-12-16 VITALS — SYSTOLIC BLOOD PRESSURE: 108 MMHG | DIASTOLIC BLOOD PRESSURE: 55 MMHG

## 2020-12-16 VITALS — SYSTOLIC BLOOD PRESSURE: 107 MMHG | DIASTOLIC BLOOD PRESSURE: 63 MMHG

## 2020-12-16 VITALS — SYSTOLIC BLOOD PRESSURE: 85 MMHG | DIASTOLIC BLOOD PRESSURE: 53 MMHG

## 2020-12-16 VITALS — DIASTOLIC BLOOD PRESSURE: 55 MMHG | SYSTOLIC BLOOD PRESSURE: 98 MMHG

## 2020-12-16 VITALS — DIASTOLIC BLOOD PRESSURE: 86 MMHG | SYSTOLIC BLOOD PRESSURE: 116 MMHG

## 2020-12-16 VITALS — SYSTOLIC BLOOD PRESSURE: 109 MMHG | DIASTOLIC BLOOD PRESSURE: 56 MMHG

## 2020-12-16 VITALS — SYSTOLIC BLOOD PRESSURE: 109 MMHG | DIASTOLIC BLOOD PRESSURE: 69 MMHG

## 2020-12-16 VITALS — SYSTOLIC BLOOD PRESSURE: 99 MMHG | DIASTOLIC BLOOD PRESSURE: 57 MMHG

## 2020-12-16 LAB
ALBUMIN SERPL BCG-MCNC: 1.6 GM/DL (ref 3.2–5.2)
ALT SERPL W P-5'-P-CCNC: 15 U/L (ref 12–78)
ANISOCYTOSIS BLD QL SMEAR: (no result)
APTT BLD: 40 SECONDS (ref 24.2–38.5)
BILIRUB SERPL-MCNC: 0.4 MG/DL (ref 0.2–1)
BUN SERPL-MCNC: 21 MG/DL (ref 7–18)
BUN SERPL-MCNC: 34 MG/DL (ref 7–18)
CALCIUM SERPL-MCNC: 8.2 MG/DL (ref 8.8–10.2)
CALCIUM SERPL-MCNC: 8.9 MG/DL (ref 8.8–10.2)
CHLORIDE SERPL-SCNC: 102 MEQ/L (ref 98–107)
CHLORIDE SERPL-SCNC: 104 MEQ/L (ref 98–107)
CHOLEST SERPL-MCNC: 110 MG/DL (ref ?–200)
CK SERPL-CCNC: 66 U/L (ref 39–308)
CO2 SERPL-SCNC: 26 MEQ/L (ref 21–32)
CO2 SERPL-SCNC: 27 MEQ/L (ref 21–32)
CREAT SERPL-MCNC: 1.73 MG/DL (ref 0.7–1.3)
CREAT SERPL-MCNC: 2.51 MG/DL (ref 0.7–1.3)
GFR SERPL CREATININE-BSD FRML MDRD: 26.3 ML/MIN/{1.73_M2} (ref 35–?)
GFR SERPL CREATININE-BSD FRML MDRD: 40.5 ML/MIN/{1.73_M2} (ref 35–?)
GLUCOSE SERPL-MCNC: 109 MG/DL (ref 70–100)
GLUCOSE SERPL-MCNC: 123 MG/DL (ref 70–100)
HCT VFR BLD AUTO: 34.6 % (ref 42–52)
HCT VFR BLD AUTO: 36.8 % (ref 42–52)
HGB BLD-MCNC: 11.4 G/DL (ref 13.5–17.5)
HGB BLD-MCNC: 11.9 G/DL (ref 13.5–17.5)
INR PPP: 1.34
LDH SERPL L TO P-CCNC: 571 U/L (ref 87–241)
LYMPHOCYTES NFR BLD MANUAL: 2 % (ref 16–44)
MAGNESIUM SERPL-MCNC: 2 MG/DL (ref 1.8–2.4)
MAGNESIUM SERPL-MCNC: 2 MG/DL (ref 1.8–2.4)
MAGNESIUM SERPL-MCNC: 2.2 MG/DL (ref 1.8–2.4)
MCH RBC QN AUTO: 29.1 PG (ref 27–33)
MCH RBC QN AUTO: 29.5 PG (ref 27–33)
MCHC RBC AUTO-ENTMCNC: 32.3 G/DL (ref 32–36.5)
MCHC RBC AUTO-ENTMCNC: 32.9 G/DL (ref 32–36.5)
MCV RBC AUTO: 89.4 FL (ref 80–96)
MCV RBC AUTO: 90 FL (ref 80–96)
MONOCYTES NFR BLD MANUAL: 3 % (ref 0–5)
NEUTROPHILS NFR BLD MANUAL: 92 % (ref 28–66)
PHOSPHATE SERPL-MCNC: 2.5 MG/DL (ref 2.5–4.9)
PHOSPHATE SERPL-MCNC: 3.3 MG/DL (ref 2.5–4.9)
PLATELET # BLD AUTO: 302 10^3/UL (ref 150–450)
PLATELET # BLD AUTO: 325 10^3/UL (ref 150–450)
PLATELET BLD QL SMEAR: NORMAL
POTASSIUM SERPL-SCNC: 4.3 MEQ/L (ref 3.5–5.1)
POTASSIUM SERPL-SCNC: 4.3 MEQ/L (ref 3.5–5.1)
POTASSIUM SERPL-SCNC: 4.4 MEQ/L (ref 3.5–5.1)
PROT SERPL-MCNC: 5.3 GM/DL (ref 6.4–8.2)
PROTHROMBIN TIME: 16.9 SECONDS (ref 12.5–14.3)
RBC # BLD AUTO: 3.87 10^6/UL (ref 4.3–6.1)
RBC # BLD AUTO: 4.09 10^6/UL (ref 4.3–6.1)
SODIUM SERPL-SCNC: 138 MEQ/L (ref 136–145)
SODIUM SERPL-SCNC: 138 MEQ/L (ref 136–145)
TRIGL SERPL-MCNC: 144 MG/DL (ref ?–150)
TROPONIN I SERPL-MCNC: 0.05 NG/ML (ref ?–0.1)
TROPONIN I SERPL-MCNC: < 0.02 NG/ML (ref ?–0.1)
WBC # BLD AUTO: 18.8 10^3/UL (ref 4–10)
WBC # BLD AUTO: 20.7 10^3/UL (ref 4–10)

## 2020-12-16 RX ADMIN — IPRATROPIUM BROMIDE AND ALBUTEROL SULFATE SCH ML: .5; 3 SOLUTION RESPIRATORY (INHALATION) at 03:48

## 2020-12-16 RX ADMIN — IPRATROPIUM BROMIDE AND ALBUTEROL SULFATE SCH ML: .5; 3 SOLUTION RESPIRATORY (INHALATION) at 07:48

## 2020-12-16 RX ADMIN — DEXTROSE MONOHYDRATE SCH MLS/HR: 50 INJECTION, SOLUTION INTRAVENOUS at 22:00

## 2020-12-16 RX ADMIN — OXYCODONE HYDROCHLORIDE AND ACETAMINOPHEN SCH MG: 500 TABLET ORAL at 09:16

## 2020-12-16 RX ADMIN — OMEPRAZOLE SCH MG: 20 CAPSULE, DELAYED RELEASE ORAL at 20:23

## 2020-12-16 RX ADMIN — POLYETHYLENE GLYCOL 3350 SCH PKT: 17 POWDER, FOR SOLUTION ORAL at 09:16

## 2020-12-16 RX ADMIN — HYDROCORTISONE SODIUM SUCCINATE SCH MG: 100 INJECTION, POWDER, FOR SOLUTION INTRAMUSCULAR; INTRAVENOUS at 07:02

## 2020-12-16 RX ADMIN — LEVOTHYROXINE SODIUM SCH MCG: 50 TABLET ORAL at 07:02

## 2020-12-16 RX ADMIN — HYDROCORTISONE SODIUM SUCCINATE SCH MG: 100 INJECTION, POWDER, FOR SOLUTION INTRAMUSCULAR; INTRAVENOUS at 22:09

## 2020-12-16 RX ADMIN — CEFOTETAN DISODIUM SCH MLS/HR: 1 INJECTION, POWDER, FOR SOLUTION INTRAMUSCULAR; INTRAVENOUS at 11:42

## 2020-12-16 RX ADMIN — ENOXAPARIN SODIUM SCH MG: 80 INJECTION SUBCUTANEOUS at 20:23

## 2020-12-16 RX ADMIN — DIGOXIN SCH MG: 0.25 INJECTION INTRAMUSCULAR; INTRAVENOUS at 01:44

## 2020-12-16 RX ADMIN — OMEPRAZOLE SCH MG: 20 CAPSULE, DELAYED RELEASE ORAL at 09:16

## 2020-12-16 RX ADMIN — MULTIPLE VITAMINS W/ MINERALS TAB SCH TAB: TAB at 09:16

## 2020-12-16 RX ADMIN — CEFOTETAN DISODIUM SCH MLS/HR: 1 INJECTION, POWDER, FOR SOLUTION INTRAMUSCULAR; INTRAVENOUS at 22:09

## 2020-12-16 RX ADMIN — HYDROCORTISONE SODIUM SUCCINATE SCH MG: 100 INJECTION, POWDER, FOR SOLUTION INTRAMUSCULAR; INTRAVENOUS at 14:38

## 2020-12-16 NOTE — ECGEPIP
[facility]

                                       

                                       Test Date:    2020

Pat Name:     CAROLINE JARVIS             Department:   

Patient ID:   [id number]                 Room:         [place] [address]

Gender:       Male                     Technician:   CF

:          1940               Requested By: Vern Jung [facility]

Order Number: [id number]     Reading MD:   Kamron You

                                 Measurements

Intervals                              Axis          

Rate:         125                      P:            35

MI:           126                      QRS:          42

QRSD:         125                      T:            -12

QT:           294                                    

QTc:          425                                    

                           Interpretive Statements

SINUS TACHYCARDIA

Low limb voltages.

RIGHT BUNDLE BRANCH BLOCK

Faster rate but no other change from 20

Electronically Signed on 2020 8:21:40 EST by Kamron You

## 2020-12-16 NOTE — REP
INDICATION:

pulm edema.



COMPARISON:

Portable chest dated 12/15/2020.



TECHNIQUE:

Single AP view performed portably with the patient sitting.



FINDINGS:

The bibasilar infiltrates are not quite as dense as previously.

There is focal discoid atelectasis inferiorly in the left lung.

No pleural effusions are identified.

There is a right subclavian central venous catheter with the tip in the superior vena

cava in satisfactory position, unchanged.





IMPRESSION:

The bibasilar  infiltrates are less dense than on the previous study.





<Electronically signed by Humberto Carrillo > 12/16/20 0735

## 2020-12-16 NOTE — IPN
CRITICAL CARE NOTE



DATE:  12/16/2020



SUBJECTIVE:  Mr. Childers is seen this morning in the Intensive Care Unit on his

bedside. Yesterday, we started CRRT due to hypotension, acute renal failure and

volume overload. CRRT is still in progress on his bedside. Patient is feeling

better today and he is off BiPAP now. He reports that he slept well last night

without BiPAP. He is still not making much urine and currently he is off his

Levophed. The nursing staff reports that after receiving his Albuterol

nebulizer he became very tachycardic with a heart rate up to 159. He is still

tachycardic on the monitor but denies any chest pain. His appetite is poor and

not eating well. He has a known history of metastatic cancer with

carcinomatosis and ascites without a primary source known. 



PHYSICAL EXAMINATION:

VITAL SIGNS: Temperature is 97.4 degrees Fahrenheit, heart rate is 146 per

minute and respiratory rate 20 per minute. Blood pressure is currently 118/88

mmHg and oxygen saturation is 90% on 5 liters oxygen. 

HEENT: Head is atraumatic. 

NECK: Supple and JVD is difficult to assess.

HEART: Heart sounds are quite tachycardic.

LUNGS: Bilateral rales, more on the left than the right.

ABDOMEN: Distended with ascites. Bowel sounds are present. 

EXTREMITIES: Without any cyanosis or clubbing. Lower extremity edema is still

2+. 

NEUROLOGIC: He is awake, alert and at his baseline mentation. 



LABORATORY DATA: Today's labs showed a WBC count of 12.7, hemoglobin 11.4 and

hematocrit 34.6. Platelets are 325,000. Sodium 138, potassium 4.3, CO2 26, BUN

34 and creatinine 2.51. Glucose 109, calcium 8.2. CPK 66, total protein 5.3 and

albumin 1.6.



PROBLEMS:

  1.  Oliguric acute renal failure. Patient is still oliguric with minimal

      urine output. He is on CRRT due to hypotension and volume overload. Will

      continue with the same and his CRRT orders are being renewed. 

  2.  Hypotension. The source of his hypotension is unclear. He is just coming

      off Levophed, however he is quite tachycardic. I will defer any plan of

      care for his atrial fibrillation to Hospitalist and Cardiology. 

  3.  Congestive heart failure. Volume status improved with CRRT and he is now

      off BiPAP. Will continue fluid removal, about 50 ml/hr as long as he can

      tolerate it. 

  4.  Anemia. He has mild anemia which is stable and does not need any urgent

      intervention. 

  5.  He has metastatic cancer with carcinomatosis. His prognosis remains poor

      due to multiorgan problems, advanced age, and primary source is not

      known.  

  6.  Protein calorie malnutrition most likely related to advanced metastatic

      cancer and multiorgan problems. Patient should be encouraged for increased

      protein intake. 

      27 minutes of critical care time spent on bedside during which no 
procedures were performed.

MTDD

## 2020-12-16 NOTE — ECHO
DATE OF PROCEDURE: 12/15/2020



Age: 80 

Gender: Male

Height: 175 cm  

Weight: 83 kg  



REFERRING PHYSICIAN: Tank Villalobos M.D. and Aleksandr Fontenot M.D. 



INDICATION: Dyspnea.  



MEASUREMENTS:

   IVS 1.0 cm

   LV 4.6 cm

   LVPW 1.9 cm

   LA 4.9 cm

   Aorta 3.7 cm

   IVC 1.4 cm 

   Mitral E wave velocity 58 cm/s 

   Mitral A wave 100 cm/s 

   E prime septal 7.1 cm/s 

   E prime lateral 11.6 cm/s 

  

FINDINGS: 

This study is of fair technical quality with somewhat challenging visualization.
Underlying sinus rhythm. 



Normal LV size with overall preserved left ventricular systolic function, 
estimated LVEF around 60% to 65%. I certainly cannot rule out subtle wall motion
abnormalities. Left ventricle is also normal size and systolic function. The 
left atrium appears borderline enlarged. Right atrium is normal size. Aortic 
valve is tricuspid. It has preserved mobility and minimal sclerotic 
abnormalities. On some views, I cannot completely rule out the presence of small
echodensities on the valve and in the appropriate clinical setting, would even 
consider the possibility of vegetation. Mitral and tricuspid valves appear 
normal. The pulmonic valve was not well seen. No pericardial effusion is noted. 
Inferior vena cava is normal size. Aortic root is normal. Aortic arch and 
abdominal aorta were not seen. 



Doppler interrogation of the aortic valve reveals no insufficiency and trivial 
stenosis with mean gradient 6 mmHg. There is trace mitral insufficiency. 
Tricuspid valve is functionally competent.



Mitral inflow pattern and tissue Doppler imaging of the mitral annulus revealed 
grade 1 diastolic dysfunction. 



CONCLUSIONS: 

1.  Study is of fair technical quality, underlying sinus rhythm. 

2.  Normal LV size with preserved LV systolic function and grade 1 diastolic 
dysfunction. 

3.  Sclerotic abnormalities of aortic valve with trivial stenosis and no 
insufficiency. There are irregularities on poorly visualized valve, in 
appropriate clinical setting would  consider the possibility of vegetation. 

4.  Trace mitral insufficiency. 

5.  Normal central venous pressure and normal pulmonary artery pressure. 



COMMENTS: 

No obvious findings to explain shortness of breath. 

[facility]

## 2020-12-16 NOTE — ECGEPIP
[facility]

                                       

                                       Test Date:    2020-12-15

Pat Name:     CAROLINE JARVIS             Department:   

Patient ID:   [id number]                 Room:         -21

Gender:       Male                     Technician:   MATILDE

:          1940               Requested By: NICKOLAS RICHARDS 

Order Number: [id number]     Reading MD:   Kamron You

                                 Measurements

Intervals                              Axis          

Rate:         154                      P:            

VT:           0                        QRS:          49

QRSD:         133                      T:            -37

QT:           307                                    

QTc:          492                                    

                           Interpretive Statements

Atrial fibrillation with rapid ventricular response

Right bundle branch block

Primary lateral T wave abnormalities

Rhythm change from 20

Clinical correlation advised

Electronically Signed on 2020 8:25:31 EST by Kamron You

## 2020-12-16 NOTE — IPNPDOC
Subjective


Date Seen


The patient was seen on 12/16/20.





Subjective


Chief Complaint/HPI


Mr. Childers is an 80 year old male with metastatic carcinoma of unknown primary 

source and chronic pulmonary embolism who was initially here for acute hypoxic 

respiratory failure and developed first shock, then atrial fibrillation with 

RVR. Overnight, patient received duonebs which may have contributed to his his 

afib with RVR alongside with Levophed. He was given 0.25mg of digoxin overnight 

with no improvement. This morning, reached out to Dr. Marcum who recommended giving

another dose of digoxin and first step amiodarone. Otherwise, he continues with 

CRRT





Objective


Physical Examination


General Exam:  Positive: Alert, Cooperative


Eye Exam:  Positive: PERRLA, Conjunctiva & lids normal, Other Eye Symptoms 

(beginning to have a sunken appearance); 


   Negative: Sclera icteric


ENT Exam:  Positive: Atraumatic, Mucous membr. moist/pink, Pharynx Normal


Neck Exam:  Positive: Supple; 


   Negative: Lymphadenopathy (including supraclavicular)


Chest Exam:  Positive: Diminished


Heart Exam:  Positive: Rate Normal, Regular Rhythm


Abdomen Exam:  Positive: Soft; 


   Negative: Normal bowel sounds, Tenderness, Hepatospenomegaly


Extremity Exam:  Positive: Normal pulses; 


   Negative: Clubbing, Cyanosis, Edema


Skin Exam:  Positive: Nl turgor and temperature; 


   Negative: Breakdown, Lesion


Neuro Exam:  Positive: Normal Speech, Cranial Nerves 3-12 NL


Psych Exam:  Positive: Mental status NL, Mood NL, Oriented x 3





Assessment /Plan


Assessment


Mr. Childers is an 80 year old male with metastatic carcinoma of unknown primary 

source and chronic pulmonary embolism who was initially here for acute hypoxic 

respiratory failure and developed first shock, then atrial fibrillation with 

RVR. Pulmonary/Critical care following for the acute hypoxic respiratory failure

and shock, recommendations appreciated. He requires Levophed today. Patient did 

go into atrial fibrillation with RVR. Duonebs and Levophed may have contributed 

to the RVR. Unable to do beta-blockers and patient did not respond to digoxin. 

Reached out to Cardiology about amiodarone drip. Recommendations appreciated. 

Otherwise, nephrology following for CRRT for acute renal failure





Plan/VTE


VTE Prophylaxis Ordered?:  Yes





Plan


1. Toxic metabolic encephalopathy


-Multifactorial. Polypharmacy with oxycodone and uremia


-On CRRT for acute renal failure


-Supportive care





2. Acute hypoxic respiratory failure


-Possibly iatrogenic from fluid resuscitation in the setting of acute renal 

failure


-Nephrology and pulmonary following, recommendations appreciated. 


-Was on bipap, now on NC


-Respiratory panel was negative (COVID negative)





3. Acute renal failure


-May be secondary to Aleve


-On admission creatinine was 5.33


-Nephrology following, recommendations appreciated


-On CRRT





4. Shock


-Unknown etiology


-On Cefotetan


-Requiring Levophed today


-On trial period of Hydrocortisone





5. Atrial fibrillation with RVR


-Levophed and duonebs may have contributed to RVR


-Did not respond to digoxin


-Consulted Cardiology, Dr. Marcum


-On amiodarone





6. Hypothyroidism


-Continue on levothyroxine





7. DVT ppx


-Lovenox 1mg/kg qD





VS, I&O, 24H, Fishbone


Vital Signs/I&O





Vital Signs








  Date Time  Temp Pulse Resp B/P (MAP) Pulse Ox O2 Delivery O2 Flow Rate FiO2


 


12/16/20 19:00  136  105/55 (72)    


 


12/16/20 16:01     90 Nasal Cannula 4.0 


 


12/16/20 15:46 96.4  20     


 


12/15/20 12:00        100














I&O- Last 24 Hours up to 6 AM 


 


 12/16/20





 06:00


 


Intake Total 827.5 ml


 


Output Total 1014 ml


 


Balance -186.5 ml











Laboratory Data


24H LABS


Laboratory Tests 2


12/15/20 23:32: 


Magnesium Level 2.0, Troponin I < 0.02#


12/16/20 04:58: 


Magnesium Level 2.0, Troponin I 0.02, Immature Granulocyte % (Auto) , Ne

utrophils (%) (Auto) , Nucleated Red Blood Cells % (auto) 0.0, Neutrophils 92H, 

Band Neutrophils 3, Lymphocytes (Manual) 2L, Monocytes (Manual) 3, Anisocytosis 

1+, Platelet Estimate NORMAL, Anion Gap 10, Glomerular Filtration Rate 26.3L, 

Calcium Level 8.2L, Phosphorus Level 3.3#, Total Bilirubin 0.4, Aspartate Amino 

Transf (AST/SGOT) 45H, Alanine Aminotransferase (ALT/SGPT) 15, Alkaline 

Phosphatase 70, Lactate Dehydrogenase 571H, Total Creatine Kinase 66, Total 

Protein 5.3L, Albumin 1.6L, Albumin/Globulin Ratio 0.4, Triglycerides Level 144,

Cholesterol Level 110


12/16/20 06:23: Whole Blood Ionized Calcium 4.6


12/16/20 18:08: 


Magnesium Level 2.2, Troponin I 0.05#, Nucleated Red Blood Cells % (auto) 0.0, 

Anion Gap 7L, Glomerular Filtration Rate 40.5, Calcium Level 8.9, Phosphorus 

Level 2.5#, Whole Blood Ionized Calcium 4.7, Prothrombin Time 16.9H, Prothromb 

Time International Ratio 1.34, Activated Partial Thromboplast Time 40.0H


CBC/BMP


Laboratory Tests


12/15/20 23:32








12/16/20 04:58








12/16/20 18:08








Microbiology





Microbiology


12/14/20 Respiratory Virus Panel (PCR) ([facility]) - Final, Complete











MARTINA LOPEZ DO               Dec 16, 2020 21:26

## 2020-12-17 VITALS — SYSTOLIC BLOOD PRESSURE: 106 MMHG | DIASTOLIC BLOOD PRESSURE: 63 MMHG

## 2020-12-17 VITALS — SYSTOLIC BLOOD PRESSURE: 91 MMHG | DIASTOLIC BLOOD PRESSURE: 50 MMHG

## 2020-12-17 VITALS — DIASTOLIC BLOOD PRESSURE: 61 MMHG | SYSTOLIC BLOOD PRESSURE: 100 MMHG

## 2020-12-17 VITALS — SYSTOLIC BLOOD PRESSURE: 71 MMHG | DIASTOLIC BLOOD PRESSURE: 48 MMHG

## 2020-12-17 VITALS — SYSTOLIC BLOOD PRESSURE: 92 MMHG | DIASTOLIC BLOOD PRESSURE: 67 MMHG

## 2020-12-17 VITALS — DIASTOLIC BLOOD PRESSURE: 76 MMHG | SYSTOLIC BLOOD PRESSURE: 111 MMHG

## 2020-12-17 VITALS — SYSTOLIC BLOOD PRESSURE: 87 MMHG | DIASTOLIC BLOOD PRESSURE: 53 MMHG

## 2020-12-17 VITALS — DIASTOLIC BLOOD PRESSURE: 55 MMHG | SYSTOLIC BLOOD PRESSURE: 98 MMHG

## 2020-12-17 VITALS — DIASTOLIC BLOOD PRESSURE: 62 MMHG | SYSTOLIC BLOOD PRESSURE: 108 MMHG

## 2020-12-17 VITALS — DIASTOLIC BLOOD PRESSURE: 62 MMHG | SYSTOLIC BLOOD PRESSURE: 104 MMHG

## 2020-12-17 VITALS — DIASTOLIC BLOOD PRESSURE: 53 MMHG | SYSTOLIC BLOOD PRESSURE: 96 MMHG

## 2020-12-17 VITALS — DIASTOLIC BLOOD PRESSURE: 57 MMHG | SYSTOLIC BLOOD PRESSURE: 123 MMHG

## 2020-12-17 VITALS — SYSTOLIC BLOOD PRESSURE: 117 MMHG | DIASTOLIC BLOOD PRESSURE: 65 MMHG

## 2020-12-17 VITALS — DIASTOLIC BLOOD PRESSURE: 56 MMHG | SYSTOLIC BLOOD PRESSURE: 95 MMHG

## 2020-12-17 VITALS — SYSTOLIC BLOOD PRESSURE: 112 MMHG | DIASTOLIC BLOOD PRESSURE: 56 MMHG

## 2020-12-17 VITALS — DIASTOLIC BLOOD PRESSURE: 53 MMHG | SYSTOLIC BLOOD PRESSURE: 76 MMHG

## 2020-12-17 VITALS — DIASTOLIC BLOOD PRESSURE: 64 MMHG | SYSTOLIC BLOOD PRESSURE: 103 MMHG

## 2020-12-17 VITALS — SYSTOLIC BLOOD PRESSURE: 112 MMHG | DIASTOLIC BLOOD PRESSURE: 55 MMHG

## 2020-12-17 VITALS — DIASTOLIC BLOOD PRESSURE: 65 MMHG | SYSTOLIC BLOOD PRESSURE: 81 MMHG

## 2020-12-17 VITALS — SYSTOLIC BLOOD PRESSURE: 97 MMHG | DIASTOLIC BLOOD PRESSURE: 55 MMHG

## 2020-12-17 VITALS — SYSTOLIC BLOOD PRESSURE: 91 MMHG | DIASTOLIC BLOOD PRESSURE: 57 MMHG

## 2020-12-17 VITALS — SYSTOLIC BLOOD PRESSURE: 90 MMHG | DIASTOLIC BLOOD PRESSURE: 60 MMHG

## 2020-12-17 VITALS — SYSTOLIC BLOOD PRESSURE: 96 MMHG | DIASTOLIC BLOOD PRESSURE: 56 MMHG

## 2020-12-17 VITALS — SYSTOLIC BLOOD PRESSURE: 94 MMHG | DIASTOLIC BLOOD PRESSURE: 54 MMHG

## 2020-12-17 VITALS — DIASTOLIC BLOOD PRESSURE: 75 MMHG | SYSTOLIC BLOOD PRESSURE: 122 MMHG

## 2020-12-17 VITALS — SYSTOLIC BLOOD PRESSURE: 94 MMHG | DIASTOLIC BLOOD PRESSURE: 57 MMHG

## 2020-12-17 VITALS — SYSTOLIC BLOOD PRESSURE: 121 MMHG | DIASTOLIC BLOOD PRESSURE: 66 MMHG

## 2020-12-17 VITALS — DIASTOLIC BLOOD PRESSURE: 55 MMHG | SYSTOLIC BLOOD PRESSURE: 109 MMHG

## 2020-12-17 VITALS — DIASTOLIC BLOOD PRESSURE: 61 MMHG | SYSTOLIC BLOOD PRESSURE: 90 MMHG

## 2020-12-17 VITALS — DIASTOLIC BLOOD PRESSURE: 49 MMHG | SYSTOLIC BLOOD PRESSURE: 81 MMHG

## 2020-12-17 VITALS — SYSTOLIC BLOOD PRESSURE: 119 MMHG | DIASTOLIC BLOOD PRESSURE: 86 MMHG

## 2020-12-17 VITALS — SYSTOLIC BLOOD PRESSURE: 106 MMHG | DIASTOLIC BLOOD PRESSURE: 57 MMHG

## 2020-12-17 VITALS — DIASTOLIC BLOOD PRESSURE: 58 MMHG | SYSTOLIC BLOOD PRESSURE: 108 MMHG

## 2020-12-17 VITALS — DIASTOLIC BLOOD PRESSURE: 60 MMHG | SYSTOLIC BLOOD PRESSURE: 101 MMHG

## 2020-12-17 VITALS — SYSTOLIC BLOOD PRESSURE: 89 MMHG | DIASTOLIC BLOOD PRESSURE: 53 MMHG

## 2020-12-17 VITALS — DIASTOLIC BLOOD PRESSURE: 57 MMHG | SYSTOLIC BLOOD PRESSURE: 120 MMHG

## 2020-12-17 VITALS — DIASTOLIC BLOOD PRESSURE: 54 MMHG | SYSTOLIC BLOOD PRESSURE: 94 MMHG

## 2020-12-17 VITALS — DIASTOLIC BLOOD PRESSURE: 72 MMHG | SYSTOLIC BLOOD PRESSURE: 129 MMHG

## 2020-12-17 VITALS — SYSTOLIC BLOOD PRESSURE: 84 MMHG | DIASTOLIC BLOOD PRESSURE: 57 MMHG

## 2020-12-17 VITALS — DIASTOLIC BLOOD PRESSURE: 50 MMHG | SYSTOLIC BLOOD PRESSURE: 80 MMHG

## 2020-12-17 VITALS — SYSTOLIC BLOOD PRESSURE: 99 MMHG | DIASTOLIC BLOOD PRESSURE: 56 MMHG

## 2020-12-17 VITALS — SYSTOLIC BLOOD PRESSURE: 126 MMHG | DIASTOLIC BLOOD PRESSURE: 65 MMHG

## 2020-12-17 VITALS — SYSTOLIC BLOOD PRESSURE: 133 MMHG | DIASTOLIC BLOOD PRESSURE: 74 MMHG

## 2020-12-17 VITALS — DIASTOLIC BLOOD PRESSURE: 64 MMHG | SYSTOLIC BLOOD PRESSURE: 98 MMHG

## 2020-12-17 VITALS — DIASTOLIC BLOOD PRESSURE: 60 MMHG | SYSTOLIC BLOOD PRESSURE: 113 MMHG

## 2020-12-17 VITALS — SYSTOLIC BLOOD PRESSURE: 86 MMHG | DIASTOLIC BLOOD PRESSURE: 49 MMHG

## 2020-12-17 VITALS — SYSTOLIC BLOOD PRESSURE: 69 MMHG | DIASTOLIC BLOOD PRESSURE: 48 MMHG

## 2020-12-17 VITALS — SYSTOLIC BLOOD PRESSURE: 91 MMHG | DIASTOLIC BLOOD PRESSURE: 64 MMHG

## 2020-12-17 VITALS — SYSTOLIC BLOOD PRESSURE: 109 MMHG | DIASTOLIC BLOOD PRESSURE: 67 MMHG

## 2020-12-17 VITALS — SYSTOLIC BLOOD PRESSURE: 104 MMHG | DIASTOLIC BLOOD PRESSURE: 59 MMHG

## 2020-12-17 VITALS — SYSTOLIC BLOOD PRESSURE: 89 MMHG | DIASTOLIC BLOOD PRESSURE: 57 MMHG

## 2020-12-17 VITALS — SYSTOLIC BLOOD PRESSURE: 105 MMHG | DIASTOLIC BLOOD PRESSURE: 54 MMHG

## 2020-12-17 VITALS — SYSTOLIC BLOOD PRESSURE: 87 MMHG | DIASTOLIC BLOOD PRESSURE: 67 MMHG

## 2020-12-17 VITALS — DIASTOLIC BLOOD PRESSURE: 57 MMHG | SYSTOLIC BLOOD PRESSURE: 104 MMHG

## 2020-12-17 VITALS — DIASTOLIC BLOOD PRESSURE: 70 MMHG | SYSTOLIC BLOOD PRESSURE: 115 MMHG

## 2020-12-17 VITALS — DIASTOLIC BLOOD PRESSURE: 63 MMHG | SYSTOLIC BLOOD PRESSURE: 124 MMHG

## 2020-12-17 VITALS — DIASTOLIC BLOOD PRESSURE: 43 MMHG | SYSTOLIC BLOOD PRESSURE: 67 MMHG

## 2020-12-17 VITALS — DIASTOLIC BLOOD PRESSURE: 57 MMHG | SYSTOLIC BLOOD PRESSURE: 98 MMHG

## 2020-12-17 VITALS — DIASTOLIC BLOOD PRESSURE: 77 MMHG | SYSTOLIC BLOOD PRESSURE: 103 MMHG

## 2020-12-17 VITALS — SYSTOLIC BLOOD PRESSURE: 105 MMHG | DIASTOLIC BLOOD PRESSURE: 56 MMHG

## 2020-12-17 VITALS — SYSTOLIC BLOOD PRESSURE: 124 MMHG | DIASTOLIC BLOOD PRESSURE: 71 MMHG

## 2020-12-17 VITALS — SYSTOLIC BLOOD PRESSURE: 106 MMHG | DIASTOLIC BLOOD PRESSURE: 69 MMHG

## 2020-12-17 VITALS — SYSTOLIC BLOOD PRESSURE: 104 MMHG | DIASTOLIC BLOOD PRESSURE: 57 MMHG

## 2020-12-17 VITALS — SYSTOLIC BLOOD PRESSURE: 112 MMHG | DIASTOLIC BLOOD PRESSURE: 66 MMHG

## 2020-12-17 VITALS — SYSTOLIC BLOOD PRESSURE: 91 MMHG | DIASTOLIC BLOOD PRESSURE: 56 MMHG

## 2020-12-17 VITALS — SYSTOLIC BLOOD PRESSURE: 72 MMHG | DIASTOLIC BLOOD PRESSURE: 50 MMHG

## 2020-12-17 VITALS — SYSTOLIC BLOOD PRESSURE: 103 MMHG | DIASTOLIC BLOOD PRESSURE: 54 MMHG

## 2020-12-17 VITALS — SYSTOLIC BLOOD PRESSURE: 109 MMHG | DIASTOLIC BLOOD PRESSURE: 58 MMHG

## 2020-12-17 VITALS — DIASTOLIC BLOOD PRESSURE: 72 MMHG | SYSTOLIC BLOOD PRESSURE: 99 MMHG

## 2020-12-17 VITALS — DIASTOLIC BLOOD PRESSURE: 61 MMHG | SYSTOLIC BLOOD PRESSURE: 145 MMHG

## 2020-12-17 VITALS — DIASTOLIC BLOOD PRESSURE: 51 MMHG | SYSTOLIC BLOOD PRESSURE: 82 MMHG

## 2020-12-17 LAB
ALBUMIN SERPL BCG-MCNC: 1.5 GM/DL (ref 3.2–5.2)
ALT SERPL W P-5'-P-CCNC: 15 U/L (ref 12–78)
ANISOCYTOSIS BLD QL SMEAR: (no result)
APTT BLD: 31.6 SECONDS (ref 24.2–38.5)
APTT BLD: 42.8 SECONDS (ref 24.2–38.5)
BILIRUB SERPL-MCNC: 0.3 MG/DL (ref 0.2–1)
BUN SERPL-MCNC: 13 MG/DL (ref 7–18)
BUN SERPL-MCNC: 9 MG/DL (ref 7–18)
CALCIUM SERPL-MCNC: 7.9 MG/DL (ref 8.8–10.2)
CALCIUM SERPL-MCNC: 8.4 MG/DL (ref 8.8–10.2)
CHLORIDE SERPL-SCNC: 103 MEQ/L (ref 98–107)
CHLORIDE SERPL-SCNC: 105 MEQ/L (ref 98–107)
CHOLEST SERPL-MCNC: 111 MG/DL (ref ?–200)
CK SERPL-CCNC: 49 U/L (ref 39–308)
CO2 SERPL-SCNC: 28 MEQ/L (ref 21–32)
CO2 SERPL-SCNC: 28 MEQ/L (ref 21–32)
CORTIS SERPL-MCNC: 61.9 UG/DL (ref 4.3–22.4)
CREAT SERPL-MCNC: 1.13 MG/DL (ref 0.7–1.3)
CREAT SERPL-MCNC: 1.41 MG/DL (ref 0.7–1.3)
GFR SERPL CREATININE-BSD FRML MDRD: 51.2 ML/MIN/{1.73_M2} (ref 35–?)
GFR SERPL CREATININE-BSD FRML MDRD: > 60 ML/MIN/{1.73_M2} (ref 35–?)
GIANT PLATELETS BLD QL SMEAR: (no result)
GLUCOSE SERPL-MCNC: 101 MG/DL (ref 70–100)
GLUCOSE SERPL-MCNC: 119 MG/DL (ref 70–100)
HCT VFR BLD AUTO: 32.9 % (ref 42–52)
HGB BLD-MCNC: 10.7 G/DL (ref 13.5–17.5)
INR PPP: 1.11
INR PPP: 1.3
LDH SERPL L TO P-CCNC: 478 U/L (ref 87–241)
LYMPHOCYTES NFR BLD MANUAL: 2 % (ref 16–44)
MAGNESIUM SERPL-MCNC: 2.1 MG/DL (ref 1.8–2.4)
MAGNESIUM SERPL-MCNC: 2.2 MG/DL (ref 1.8–2.4)
MCH RBC QN AUTO: 29.5 PG (ref 27–33)
MCHC RBC AUTO-ENTMCNC: 32.5 G/DL (ref 32–36.5)
MCV RBC AUTO: 90.6 FL (ref 80–96)
MONOCYTES NFR BLD MANUAL: 1 % (ref 0–5)
NEUTROPHILS NFR BLD MANUAL: 93 % (ref 28–66)
PHOSPHATE SERPL-MCNC: 1.6 MG/DL (ref 2.5–4.9)
PHOSPHATE SERPL-MCNC: 2 MG/DL (ref 2.5–4.9)
PLATELET # BLD AUTO: 270 10^3/UL (ref 150–450)
PLATELET BLD QL SMEAR: NORMAL
POLYCHROMASIA BLD QL SMEAR: (no result)
POTASSIUM SERPL-SCNC: 4 MEQ/L (ref 3.5–5.1)
POTASSIUM SERPL-SCNC: 4.2 MEQ/L (ref 3.5–5.1)
PROT SERPL-MCNC: 5 GM/DL (ref 6.4–8.2)
PROTHROMBIN TIME: 14.5 SECONDS (ref 12.5–14.3)
PROTHROMBIN TIME: 16.5 SECONDS (ref 12.5–14.3)
RBC # BLD AUTO: 3.63 10^6/UL (ref 4.3–6.1)
SODIUM SERPL-SCNC: 137 MEQ/L (ref 136–145)
SODIUM SERPL-SCNC: 138 MEQ/L (ref 136–145)
TRIGL SERPL-MCNC: 229 MG/DL (ref ?–150)
WBC # BLD AUTO: 18.7 10^3/UL (ref 4–10)

## 2020-12-17 RX ADMIN — OMEPRAZOLE SCH MG: 20 CAPSULE, DELAYED RELEASE ORAL at 09:07

## 2020-12-17 RX ADMIN — HYDROCORTISONE SODIUM SUCCINATE SCH MG: 100 INJECTION, POWDER, FOR SOLUTION INTRAMUSCULAR; INTRAVENOUS at 05:49

## 2020-12-17 RX ADMIN — DEXTROSE MONOHYDRATE SCH MLS/HR: 50 INJECTION, SOLUTION INTRAVENOUS at 04:00

## 2020-12-17 RX ADMIN — HYDROCORTISONE SODIUM SUCCINATE SCH MG: 100 INJECTION, POWDER, FOR SOLUTION INTRAMUSCULAR; INTRAVENOUS at 14:27

## 2020-12-17 RX ADMIN — CEFOTETAN DISODIUM SCH MLS/HR: 1 INJECTION, POWDER, FOR SOLUTION INTRAMUSCULAR; INTRAVENOUS at 21:59

## 2020-12-17 RX ADMIN — HYDROCORTISONE SODIUM SUCCINATE SCH MG: 100 INJECTION, POWDER, FOR SOLUTION INTRAMUSCULAR; INTRAVENOUS at 21:59

## 2020-12-17 RX ADMIN — MULTIPLE VITAMINS W/ MINERALS TAB SCH TAB: TAB at 09:07

## 2020-12-17 RX ADMIN — ENOXAPARIN SODIUM SCH MG: 80 INJECTION SUBCUTANEOUS at 20:22

## 2020-12-17 RX ADMIN — OXYCODONE HYDROCHLORIDE AND ACETAMINOPHEN SCH MG: 500 TABLET ORAL at 09:07

## 2020-12-17 RX ADMIN — AMIODARONE HYDROCHLORIDE SCH MG: 200 TABLET ORAL at 20:22

## 2020-12-17 RX ADMIN — LEVOTHYROXINE SODIUM SCH MCG: 50 TABLET ORAL at 05:49

## 2020-12-17 RX ADMIN — POLYETHYLENE GLYCOL 3350 SCH PKT: 17 POWDER, FOR SOLUTION ORAL at 09:06

## 2020-12-17 RX ADMIN — OMEPRAZOLE SCH MG: 20 CAPSULE, DELAYED RELEASE ORAL at 20:22

## 2020-12-17 RX ADMIN — CEFOTETAN DISODIUM SCH MLS/HR: 1 INJECTION, POWDER, FOR SOLUTION INTRAMUSCULAR; INTRAVENOUS at 09:24

## 2020-12-17 NOTE — IPNPDOC
Subjective


Date Seen


The patient was seen on 12/17/20.





Subjective


Chief Complaint/HPI


Mr. Childers is an 80 year old male with metastatic carcinoma of unknown primary 

source and chronic pulmonary embolism who was initially here for acute hypoxic 

respiratory failure and developed first shock, then atrial fibrillation with 

RVR. Patient denies palpitations or chest pain. Otherwise, he continues on CRRT.

Levophed requirements have decreased





Objective


Physical Examination


General Exam:  Positive: Alert, Cooperative


Eye Exam:  Positive: PERRLA, Conjunctiva & lids normal, Other Eye Symptoms 

(beginning to have a sunken appearance); 


   Negative: Sclera icteric


ENT Exam:  Positive: Atraumatic, Mucous membr. moist/pink, Pharynx Normal


Neck Exam:  Positive: Supple; 


   Negative: Lymphadenopathy (including supraclavicular)


Chest Exam:  Positive: Diminished


Heart Exam:  Positive: Rate Normal, Regular Rhythm


Abdomen Exam:  Positive: Soft; 


   Negative: Normal bowel sounds, Tenderness, Hepatospenomegaly


Extremity Exam:  Positive: Normal pulses; 


   Negative: Clubbing, Cyanosis, Edema


Skin Exam:  Positive: Nl turgor and temperature; 


   Negative: Breakdown, Lesion


Neuro Exam:  Positive: Normal Speech, Cranial Nerves 3-12 NL


Psych Exam:  Positive: Mental status NL, Mood NL, Oriented x 3





Assessment /Plan


Assessment


Mr. Childers is an 80 year old male with metastatic carcinoma of unknown primary 

source and chronic pulmonary embolism who was initially here for acute hypoxic 

respiratory failure and developed first shock, then atrial fibrillation with 

RVR. Pulmonary/Critical care following for the acute hypoxic respiratory failure

and shock, recommendations appreciated. Levophed requirement has decreased. 

Patient is currently in atrial fibrillation with RVR. Cardiology following and 

recommendations appreciated. Unable to do beta-blockers and patient did not 

respond to digoxin. Patient on amiodarone. Otherwise, nephrology following for 

CRRT for acute renal failure





Plan/VTE


VTE Prophylaxis Ordered?:  Yes





Plan


1. Toxic metabolic encephalopathy


-Multifactorial. Polypharmacy with oxycodone and uremia


-On CRRT for acute renal failure


-Supportive care





2. Acute hypoxic respiratory failure


-Possibly iatrogenic from fluid resuscitation in the setting of acute renal 

failure


-Nephrology and pulmonary following, recommendations appreciated. 


-Was on bipap, now on NC


-Respiratory panel was negative (COVID negative)





3. Acute renal failure


-May be secondary to Aleve


-On admission creatinine was 5.33


-Nephrology following, recommendations appreciated


-On CRRT





4. Shock


-Unknown etiology


-On Cefotetan


-Requiring Levophed today, but has been weaned down to 1 to 2 mcg/min


-On trial period of Hydrocortisone





5. Atrial fibrillation with RVR


-Levophed and duonebs may have contributed to RVR


-Did not respond to digoxin


-Consulted Cardiology, Dr. Marcum/Marily


-On amiodarone





6. Hypothyroidism


-Continue on levothyroxine





7. DVT ppx


-Lovenox 1mg/kg qD





VS, I&O, 24H, Fishbone


Vital Signs/I&O





Vital Signs








  Date Time  Temp Pulse Resp B/P (MAP) Pulse Ox O2 Delivery O2 Flow Rate FiO2


 


12/17/20 18:15  136  104/62 (76) 96 High Flow Cannula 5.0 


 


12/17/20 16:00 97.6  20     


 


12/15/20 12:00        100














I&O- Last 24 Hours up to 6 AM 


 


 12/17/20





 06:00


 


Intake Total 1205.0 ml


 


Output Total 756 ml


 


Balance 449.0 ml











Laboratory Data


24H LABS


Laboratory Tests 2


12/17/20 05:40: 


Prothrombin Time 16.5H, Prothromb Time International Ratio 1.30, Activated 

Partial Thromboplast Time 42.8H, Anion Gap 4L, Glomerular Filtration Rate 51.2, 

Calcium Level 8.4L, Phosphorus Level 1.6#L, Magnesium Level 2.1, Total Bilirubin

0.3, Aspartate Amino Transf (AST/SGOT) 41H, Alanine Aminotransferase (ALT/SGPT) 

15, Alkaline Phosphatase 91, Lactate Dehydrogenase 478H, Total Creatine Kinase 

49, Total Protein 5.0L, Albumin 1.5L, Albumin/Globulin Ratio 0.4, Triglycerides 

Level 229H, Cholesterol Level 111, Cortisol AM Sample 61.9H


12/17/20 05:42: 


Immature Granulocyte % (Auto) , Neutrophils (%) (Auto) , Nucleated Red Blood 

Cells % (auto) 0.0, Neutrophils 93H, Band Neutrophils 4, Lymphocytes (Manual) 

2L, Monocytes (Manual) 1, Polychromasia 1+, Anisocytosis 1+, Giant Platelets 1+,

Platelet Estimate NORMAL, Whole Blood Ionized Calcium 4.7


CBC/BMP


Laboratory Tests


12/17/20 05:40








12/17/20 05:42








Microbiology





Microbiology


12/14/20 Respiratory Virus Panel (PCR) ([facility]) - Final, Complete











MARTINA LOPEZ DO               Dec 17, 2020 18:50

## 2020-12-17 NOTE — IPN
NEPHROLOGY CRITICAL CARE PROGRESS NOTE



DATE:  12/17/2020



SUBJECTIVE:  Mr. Childers is seen this morning at his bedside. He is feeling about

the same and denies any nausea, vomiting, dyspnea or chest pain. He remains in

atrial fibrillation with a ventricular rate in the 130's. He is currently on an

Amiodarone drip. He had minimal urine output and remains on CRRT due to acute

renal failure. Currently he is off Levophed, but he has been hypotensive

requiring Levophed through the night. He has not required BIPAP anymore and his

oxygen saturation has been about 90% with high flow nasal cannula. 



PHYSICAL EXAMINATION:  

VITAL SIGNS: Temperature 98 degrees Fahrenheit, heart rate 130's, and blood

pressure 113/60 mm of mercury. Oxygen saturation is 94%.

HEENT: His head is atraumatic. 

NECK: Supple and JVD is not abnormally elevated. 

HEART: Tachycardic and irregular.

LUNGS: Diminished breath sounds at the bases.

ABDOMEN: Distended with ascites and bowel sounds are present. 

EXTREMITIES: Without any cyanosis or clubbing. He has edema on his feet and

ankles bilaterally. 

NEUROLOGICALLY: He is awake and grossly intact. 



LABORATORY STUDIES: Today's labs show a WBC count of 18.7, hemoglobin 10.7 and

hematocrit 32.9, platelets 270. 



Sodium 137, potassium 4.0, CO2 28, BUN 13, and creatinine 1.41, glucose 119 and

calcium 8.4. Phosphorous is 1.6 today and albumin is 1.5.



His cortisol level is 61.9. 



PROBLEMS:

1.  Acute renal failure - The patient remains oliguric and has been on CRRT. He

    is quite tachycardic, and at times he is hypotensive requiring pressors. We

    will continue with CRRT for the next 24 hours. He is not suitable for

    intermittent hemodialysis at this time due to unstable cardiac function.

2.  Congestive heart failure and hypoxemia  his volume status is gradually

    improving and we will continue fluid removal with CRRT as tolerated. 

3.  Anemia  at present anemia is stable and does not need any urgent

    intervention. 

4.  Metastatic cancer with carcinomatosis  his long term prognosis remains

    poor. The patient wishes to continue with acute care at this point. His

    prognosis remains poor.

5.  Protein calorie malnutrition  this is related to decreased oral intake and

    metastatic cancer. The patient is being encouraged to increase his protein

    intake.

27 minutes of critical care time spent on bedside during which no procedures 
performed.

MTDD

## 2020-12-17 NOTE — CCN
CRITICAL CARE NOTE



DATE:  12/17/2020



SUBJECTIVE: The patient is seen in the intensive care unit on high-flow oxygen

and hypotensive requiring Levophed be added back in. In the night, he developed

atrial fibrillation with rapid ventricular response. He also continues on CRRT. 



OBJECTIVE:

VITAL SIGNS: At bedside, his temperature is 97, pulse rate 143, blood pressure

121/66, respiratory rate 20, saturation on oxygen is now 91%. I and O for the

past 24 hours 1155 in, 1643 out; since midnight 100 in, 22 mL out. CRRT is

ongoing. 

GENERAL APPEARANCE: He is ill-appearing.

HEENT: His moral mucosa is somewhat dry. 

NECK: Supple. 

HEART: Sounds are irregularly irregular. Monitor showing atrial fibrillation.

Insertion site for his right subclavian catheter is clean.

CHEST: Symmetric. 

RESPIRATORY: Breath sounds mildly diminished. Coarse clear. There is some

dullness in the left base. 

ABDOMEN: Distended. There is evidence of ascites. 

EXTREMITIES: Cool. Pulses are palpable.



DIAGNOSTIC STUDIES: White cell count remains somewhat elevated at 18.7,

hemoglobin is down to 10.7, hematocrit 32.9, and platelet count of 270,000.

Differential white cell count shows 93% neutrophils. The electrolytes are

sodium 137, potassium 4.0. chloride 105, CO2 of 28, BUN of 13, creatinine 1.41,

glucose is 119. His calcium is 8.4 and albumin of 1.5. The AST is 41, ALT 15,

. PT 16, PTT 42.8, INR 1.3. 



Chest x-ray was reviewed and is essentially unchanged. There is some

atelectasis in the left base. 



Pleural fluid was exudative. No culture was sent. No malignant cells are

appreciated by pathology. 



MEDICATIONS: On review, this is day #2 of Cefotan. He has been given amiodarone

and digoxin in the night. Potassium replacement. He is receiving Xopenex,

hydrocortisone 50 mg every eight hours, Levophed titrated for a mean arterial

pressure of 65, and Lovenox 80 mg every 24 hours. 



ASSESSMENT AND PLAN: The primary problem requiring critical attention is

hypoxemia. Oxygen requirement is slightly less and he has been able to stay off

noninvasive ventilation; however, his saturations remain somewhat tenuous. We

will continue with high-flow oxygen.



Hypotension:  The patient is back on Levophed. Central venous pressure (CVP)

measured 11 this morning. 



Acute kidney injury:  Continuous renal replacement therapy (CRRT) continues.

There is minimal urine output. 



Atrial fibrillation:  His ventricular response is still somewhat rapid.

Cardiology is consulting.



Deep vein thrombosis (DVT) prophylaxis:  This is being addressed with Lovenox. 

The dose is high. Initially, there was concern over possibly pulmonary embolism

and now with his cardiac arrhythmia, this may be an appropriate dose of

Lovenox. Will defer to cardiology.



Diffuse adenocarcinoma of unknown primary:  The pleural fluid is likely

reactive, though it may be related to a malignant abdominal effusion. No

malignant cells were appreciated. 



His condition is critical. Prognosis is poor. 



51 minutes were spent in the provision of bedside critical care and

coordination, exclusive of any procedure time.

## 2020-12-17 NOTE — REP
INDICATION:

pulm edema.



COMPARISON:

Portable chest dated 12/16/2020.



TECHNIQUE:

The there is a single AP view of the chest performed portably with the patient upright.



FINDINGS:

The bilateral infiltrates are unchanged.

The discoid atelectasis inferiorly in the left lung is unchanged.

The right subclavian central venous catheter is unchanged.

Cardiac size is normal.





IMPRESSION:

There is no significant interval change.





<Electronically signed by Humberto Carrillo > 12/17/20 [id number]

## 2020-12-18 VITALS — SYSTOLIC BLOOD PRESSURE: 100 MMHG | DIASTOLIC BLOOD PRESSURE: 51 MMHG

## 2020-12-18 VITALS — DIASTOLIC BLOOD PRESSURE: 70 MMHG | SYSTOLIC BLOOD PRESSURE: 120 MMHG

## 2020-12-18 VITALS — DIASTOLIC BLOOD PRESSURE: 66 MMHG | SYSTOLIC BLOOD PRESSURE: 123 MMHG

## 2020-12-18 VITALS — DIASTOLIC BLOOD PRESSURE: 66 MMHG | SYSTOLIC BLOOD PRESSURE: 115 MMHG

## 2020-12-18 VITALS — SYSTOLIC BLOOD PRESSURE: 124 MMHG | DIASTOLIC BLOOD PRESSURE: 66 MMHG

## 2020-12-18 VITALS — DIASTOLIC BLOOD PRESSURE: 58 MMHG | SYSTOLIC BLOOD PRESSURE: 137 MMHG

## 2020-12-18 VITALS — SYSTOLIC BLOOD PRESSURE: 135 MMHG | DIASTOLIC BLOOD PRESSURE: 74 MMHG

## 2020-12-18 VITALS — DIASTOLIC BLOOD PRESSURE: 55 MMHG | SYSTOLIC BLOOD PRESSURE: 94 MMHG

## 2020-12-18 VITALS — SYSTOLIC BLOOD PRESSURE: 112 MMHG | DIASTOLIC BLOOD PRESSURE: 57 MMHG

## 2020-12-18 VITALS — DIASTOLIC BLOOD PRESSURE: 60 MMHG | SYSTOLIC BLOOD PRESSURE: 123 MMHG

## 2020-12-18 VITALS — SYSTOLIC BLOOD PRESSURE: 113 MMHG | DIASTOLIC BLOOD PRESSURE: 65 MMHG

## 2020-12-18 VITALS — SYSTOLIC BLOOD PRESSURE: 123 MMHG | DIASTOLIC BLOOD PRESSURE: 59 MMHG

## 2020-12-18 VITALS — SYSTOLIC BLOOD PRESSURE: 119 MMHG | DIASTOLIC BLOOD PRESSURE: 65 MMHG

## 2020-12-18 VITALS — SYSTOLIC BLOOD PRESSURE: 103 MMHG | DIASTOLIC BLOOD PRESSURE: 51 MMHG

## 2020-12-18 VITALS — SYSTOLIC BLOOD PRESSURE: 123 MMHG | DIASTOLIC BLOOD PRESSURE: 60 MMHG

## 2020-12-18 VITALS — SYSTOLIC BLOOD PRESSURE: 98 MMHG | DIASTOLIC BLOOD PRESSURE: 53 MMHG

## 2020-12-18 VITALS — SYSTOLIC BLOOD PRESSURE: 148 MMHG | DIASTOLIC BLOOD PRESSURE: 77 MMHG

## 2020-12-18 VITALS — SYSTOLIC BLOOD PRESSURE: 120 MMHG | DIASTOLIC BLOOD PRESSURE: 66 MMHG

## 2020-12-18 VITALS — DIASTOLIC BLOOD PRESSURE: 65 MMHG | SYSTOLIC BLOOD PRESSURE: 115 MMHG

## 2020-12-18 VITALS — SYSTOLIC BLOOD PRESSURE: 117 MMHG | DIASTOLIC BLOOD PRESSURE: 69 MMHG

## 2020-12-18 VITALS — SYSTOLIC BLOOD PRESSURE: 94 MMHG | DIASTOLIC BLOOD PRESSURE: 55 MMHG

## 2020-12-18 VITALS — DIASTOLIC BLOOD PRESSURE: 55 MMHG | SYSTOLIC BLOOD PRESSURE: 105 MMHG

## 2020-12-18 VITALS — SYSTOLIC BLOOD PRESSURE: 111 MMHG | DIASTOLIC BLOOD PRESSURE: 62 MMHG

## 2020-12-18 VITALS — SYSTOLIC BLOOD PRESSURE: 113 MMHG | DIASTOLIC BLOOD PRESSURE: 59 MMHG

## 2020-12-18 VITALS — DIASTOLIC BLOOD PRESSURE: 63 MMHG | SYSTOLIC BLOOD PRESSURE: 116 MMHG

## 2020-12-18 VITALS — DIASTOLIC BLOOD PRESSURE: 63 MMHG | SYSTOLIC BLOOD PRESSURE: 126 MMHG

## 2020-12-18 VITALS — DIASTOLIC BLOOD PRESSURE: 65 MMHG | SYSTOLIC BLOOD PRESSURE: 127 MMHG

## 2020-12-18 VITALS — SYSTOLIC BLOOD PRESSURE: 155 MMHG | DIASTOLIC BLOOD PRESSURE: 69 MMHG

## 2020-12-18 VITALS — DIASTOLIC BLOOD PRESSURE: 68 MMHG | SYSTOLIC BLOOD PRESSURE: 121 MMHG

## 2020-12-18 VITALS — DIASTOLIC BLOOD PRESSURE: 58 MMHG | SYSTOLIC BLOOD PRESSURE: 105 MMHG

## 2020-12-18 LAB
ALBUMIN SERPL BCG-MCNC: 1.6 GM/DL (ref 3.2–5.2)
ALT SERPL W P-5'-P-CCNC: 17 U/L (ref 12–78)
APTT BLD: 31.3 SECONDS (ref 24.2–38.5)
BASOPHILS # BLD AUTO: 0 10^3/UL (ref 0–0.2)
BASOPHILS NFR BLD AUTO: 0.2 % (ref 0–1)
BILIRUB SERPL-MCNC: 0.4 MG/DL (ref 0.2–1)
BUN SERPL-MCNC: 7 MG/DL (ref 7–18)
CALCIUM SERPL-MCNC: 8.4 MG/DL (ref 8.8–10.2)
CHLORIDE SERPL-SCNC: 104 MEQ/L (ref 98–107)
CHOLEST SERPL-MCNC: 135 MG/DL (ref ?–200)
CK SERPL-CCNC: 31 U/L (ref 39–308)
CO2 SERPL-SCNC: 30 MEQ/L (ref 21–32)
CREAT SERPL-MCNC: 1.1 MG/DL (ref 0.7–1.3)
EOSINOPHIL # BLD AUTO: 0 10^3/UL (ref 0–0.5)
EOSINOPHIL NFR BLD AUTO: 0 % (ref 0–3)
GFR SERPL CREATININE-BSD FRML MDRD: > 60 ML/MIN/{1.73_M2} (ref 35–?)
GLUCOSE SERPL-MCNC: 90 MG/DL (ref 70–100)
HCT VFR BLD AUTO: 35.2 % (ref 42–52)
HGB BLD-MCNC: 11.6 G/DL (ref 13.5–17.5)
INR PPP: 1.13
LDH SERPL L TO P-CCNC: 467 U/L (ref 87–241)
LYMPHOCYTES # BLD AUTO: 0.5 10^3/UL (ref 1.5–5)
LYMPHOCYTES NFR BLD AUTO: 2.3 % (ref 24–44)
MAGNESIUM SERPL-MCNC: 2 MG/DL (ref 1.8–2.4)
MCH RBC QN AUTO: 29.7 PG (ref 27–33)
MCHC RBC AUTO-ENTMCNC: 33 G/DL (ref 32–36.5)
MCV RBC AUTO: 90.3 FL (ref 80–96)
MONOCYTES # BLD AUTO: 0.3 10^3/UL (ref 0–0.8)
MONOCYTES NFR BLD AUTO: 1.7 % (ref 0–5)
NEUTROPHILS # BLD AUTO: 18.9 10^3/UL (ref 1.5–8.5)
NEUTROPHILS NFR BLD AUTO: 95.1 % (ref 36–66)
PHOSPHATE SERPL-MCNC: 2.8 MG/DL (ref 2.5–4.9)
PLATELET # BLD AUTO: 268 10^3/UL (ref 150–450)
POTASSIUM SERPL-SCNC: 4.2 MEQ/L (ref 3.5–5.1)
PROT SERPL-MCNC: 5.3 GM/DL (ref 6.4–8.2)
PROTHROMBIN TIME: 14.8 SECONDS (ref 12.5–14.3)
RBC # BLD AUTO: 3.9 10^6/UL (ref 4.3–6.1)
SODIUM SERPL-SCNC: 137 MEQ/L (ref 136–145)
TRIGL SERPL-MCNC: 249 MG/DL (ref ?–150)
WBC # BLD AUTO: 19.8 10^3/UL (ref 4–10)

## 2020-12-18 RX ADMIN — HYDROCORTISONE SODIUM SUCCINATE SCH MG: 100 INJECTION, POWDER, FOR SOLUTION INTRAMUSCULAR; INTRAVENOUS at 07:00

## 2020-12-18 RX ADMIN — ENOXAPARIN SODIUM SCH MG: 80 INJECTION SUBCUTANEOUS at 22:27

## 2020-12-18 RX ADMIN — CEFOTETAN DISODIUM SCH MLS/HR: 1 INJECTION, POWDER, FOR SOLUTION INTRAMUSCULAR; INTRAVENOUS at 10:32

## 2020-12-18 RX ADMIN — AMIODARONE HYDROCHLORIDE SCH MG: 200 TABLET ORAL at 08:43

## 2020-12-18 RX ADMIN — POLYETHYLENE GLYCOL 3350 SCH PKT: 17 POWDER, FOR SOLUTION ORAL at 08:33

## 2020-12-18 RX ADMIN — SENNOSIDES SCH TAB: 8.6 TABLET, FILM COATED ORAL at 14:37

## 2020-12-18 RX ADMIN — CEFOTETAN DISODIUM SCH MLS/HR: 1 INJECTION, POWDER, FOR SOLUTION INTRAMUSCULAR; INTRAVENOUS at 22:27

## 2020-12-18 RX ADMIN — OMEPRAZOLE SCH MG: 20 CAPSULE, DELAYED RELEASE ORAL at 08:34

## 2020-12-18 RX ADMIN — OXYCODONE HYDROCHLORIDE AND ACETAMINOPHEN SCH MG: 500 TABLET ORAL at 08:34

## 2020-12-18 RX ADMIN — HYDROCORTISONE SODIUM SUCCINATE SCH MG: 100 INJECTION, POWDER, FOR SOLUTION INTRAMUSCULAR; INTRAVENOUS at 22:27

## 2020-12-18 RX ADMIN — MULTIPLE VITAMINS W/ MINERALS TAB SCH TAB: TAB at 08:34

## 2020-12-18 RX ADMIN — OMEPRAZOLE SCH MG: 20 CAPSULE, DELAYED RELEASE ORAL at 22:28

## 2020-12-18 RX ADMIN — DEXTROSE MONOHYDRATE SCH MLS/HR: 50 INJECTION, SOLUTION INTRAVENOUS at 04:00

## 2020-12-18 RX ADMIN — LEVOTHYROXINE SODIUM SCH MCG: 50 TABLET ORAL at 07:00

## 2020-12-18 RX ADMIN — AMIODARONE HYDROCHLORIDE SCH MG: 200 TABLET ORAL at 22:28

## 2020-12-18 RX ADMIN — HYDROCORTISONE SODIUM SUCCINATE SCH MG: 100 INJECTION, POWDER, FOR SOLUTION INTRAMUSCULAR; INTRAVENOUS at 14:37

## 2020-12-18 RX ADMIN — SENNOSIDES SCH TAB: 8.6 TABLET, FILM COATED ORAL at 22:28

## 2020-12-18 NOTE — CCN
CRITICAL CARE NOTE



DATE:  12/18/2020



CRITICAL CARE TIME: 43 minutes. This excludes all procedures. 



SUBJECTIVE: At the patient's bedside, the patient is conversant. He states he

has abdominal cramping and otherwise no pain. Denies any shortness of breath,

despite his severe hypoxia. Remains on CRRT. Last evening, he converted from

AFib to sinus and in doing so, blood pressure improved. No longer requiring

Levophed. Has a persistent worsening leukocytosis on cefotetan.  



OBJECTIVE:

VITAL SIGNS: Temperature 97.6, pulse 83, respiratory rate 18, blood pressure

123 to 130 systolic/66 diastolic with a mean arterial pressure of 85. Oxygen

saturation is 91% on 5 liters. 

GENERAL: Awake, alert, and oriented. Affect and mood are appropriate, although

seen slightly confused at times. He does appear ill.

HEENT: Mucous membranes are on the dry side. Tongue is midline. Sclerae clear

and anicteric. Pupils equal, round, and reactive to light. 

NECK:  Supple. No tracheal deviation or mass. 

CARDIAC: Regular S1, S2 without audible murmur, rub, or gallop. There is an

elevated JVP. CVP is down to 8. The area of the right subclavian line is

without surrounding erythema or exudate. 

PULMONARY: Decreased breath sounds, especially at the left base, and otherwise

clear. There is dullness at the left base of the lungs. No prolongation of

expiratory phase. No wheeze.

ABDOMEN: Distended and has evidence of ascites with shifting dullness. I am

unable to palpate any hepatosplenomegaly due to his abdominal distention.

EXTREMITIES: Cool, but peripheral pulses are palpable and regular. There is no

cyanosis or clubbing. 



LABORATORY EVALUATION: White blood cell count is 19.8, hemoglobin 11.6,

platelet count of 268,000 with neutrophils of 95%. Sodium is 137, potassium

4.2, chloride 104, bicarb of 30, BUN of 7, creatinine of 1.10. Calcium is 84.

ICAL is 4.6. Albumin is low.



IMAGING: Chest x-ray shows a little bit of a right hilar infiltrate. Left

pleural effusion is present with fluid in the left fissure. No evidence of

pneumothorax. 



ASSESSMENT:

1.  Hypoxia severe in nature still requiring 5 liters. He has not required

    BiLevel noninvasive therapy overnight. Will continue oxygen. Likely

    secondary to decompensated state and extravascular fluid overload. 

2.  Leukocytosis with bandemia. Blood cultures have not been checked. It is

    quite possible that his leukocytosis is from his metastatic cancer of

    unknown primary. However, would be vigilant monitoring for ongoing

    infection. He is on one antibiotic currently cefotetan and he is showing no

    signs of septic shock. However, would continue workup for infectious

    etiology. 

3.  Diffuse cancer of unknown primary. Oncology following.

4.  Protein malnourishment. Encourage p.o. intake. 

5.  Deep vein thrombosis (DVT) prophylaxis with Lovenox.  

6.  Gastrointestinal (GI) prophylaxis with Prilosec. 

7.  Hypothyroidism on Synthroid. 

8.  Prognosis extremely poor given his evidence of metastatic cancer.

## 2020-12-18 NOTE — REP
INDICATION:

pulm edema.



COMPARISON:

Portable chest dated 12/17/2020.

Chest CT dated 11/19/2020.



TECHNIQUE:

Single AP view of the chest performed portably with the patient sitting.



FINDINGS:

The patient had ultrasound guided left thoracentesis on 12/14/2020 where 1800 mL of

fluid was withdrawn.

The interstitial infiltrative pattern appears slightly improved.

The discoid atelectasis inferiorly in the left lung is unchanged.

The left hemidiaphragm is again obscured suggestive of a left lower lobe

infiltrate/effusion/combination, however, this is unchanged.

The right subclavian central venous catheter is unchanged.

Cardiac size is normal.





IMPRESSION:

The interstitial infiltrative pattern appears slightly improved.

There is no other interval change.





<Electronically signed by Humberto Carrillo > 12/18/20 1618

## 2020-12-18 NOTE — REP
INDICATION:

Ischemic bowel, ARF.



COMPARISON:

Comparison CT study of the abdomen and pelvis November 25, 2020.  November 19, 2020

prior study is also reviewed.  This latter exam was done with IV contrast..



TECHNIQUE:

Helical scanning is acquired and 3 mm axial images re-formatted.  Coronal and sagittal

MPR images are generated.  The CT contrast enhancement dose is 100 mL of intravenous

Isovue 370.



FINDINGS:

Digital preliminary  radiograph demonstrates a mild ileus pattern in the bowel

gas.  There is a large amount of stool in the rectum which appears to be distended.

There is a right femoral venous line and a prosthetic right hip.



Axial CT images demonstrate bilateral pleural effusions, moderate on the left and

small on the right.  This is a new finding on the right and slightly increased on the

left compared to the prior study.  There is new ground-glass opacity in the right

middle lobe and right lower lobe today question pneumonia.  There is persistent

atelectasis in the left lower lobe and to a lesser extent the right lower lobe.



Mild diffuse abdominal ascites is seen.  There is multifocal peritoneal tumor studding

and omental caking again noted.  A large enhancing mass is seen centered in the lesser

sac and anterior to the pancreas as before.  No intrahepatic lesion is seen.  There is

1 small cyst in the right lobe.  The spleen is unremarkable.  Renal cysts are again

noted.



Celiac axis and superior mesenteric arteries remain patent.  The inferior mesenteric

artery is patent.  There is a Lemons catheter in the urinary bladder.  There is no

evidence of free intraperitoneal air.  There is formed stool dilating the rectum

question constipation fecal impaction pattern.  Mild ileus pattern in the central

abdominal small bowel.



IMPRESSION:

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  There is mild

ascites.  New right pleural effusion.  Moderate left pleural effusion.  Ileus pattern

in the bowel gas.  No evidence of free air.  Large amount of stool is seen distending

the rectum question constipation fecal impaction.





<Electronically signed by Alex Braxton > 12/18/20 [id number]

## 2020-12-18 NOTE — IPNPDOC
Subjective


Date Seen


The patient was seen on 12/18/20.





Subjective


Chief Complaint/HPI


Mr. Childers is an 80 year old male with metastatic carcinoma of unknown primary 

source and chronic pulmonary embolism who was initially here for acute hypoxic 

respiratory failure and developed first shock, then atrial fibrillation with 

RVR. Today, he was weaned off of Levophed. We may be able to pursue intermittent

dialysis if his renal function does not return. Otherwise today, denies fever, 

chest pain, or dyspnea. Abdomen appears distended and tight. Went for a CT angio

of abdomen and pelvis. Demonstrated unchanged peritoneal carcinomatosis and 

ileus patter of bowel gas with large amount of stool.





Objective


Physical Examination


General Exam:  Positive: Alert, Cooperative


Eye Exam:  Positive: PERRLA, Conjunctiva & lids normal, Other Eye Symptoms 

(beginning to have a sunken appearance); 


   Negative: Sclera icteric


ENT Exam:  Positive: Atraumatic, Mucous membr. moist/pink, Pharynx Normal


Neck Exam:  Positive: Supple; 


   Negative: Lymphadenopathy (including supraclavicular)


Chest Exam:  Positive: Diminished


Heart Exam:  Positive: Rate Normal, Regular Rhythm


Abdomen Exam:  Positive: Other (Distended); 


   Negative: Normal bowel sounds, Soft, Tenderness


Extremity Exam:  Positive: Normal pulses; 


   Negative: Clubbing, Cyanosis, Edema


Skin Exam:  Positive: Nl turgor and temperature; 


   Negative: Breakdown, Lesion


Neuro Exam:  Positive: Normal Speech, Cranial Nerves 3-12 NL


Psych Exam:  Positive: Mental status NL, Mood NL, Oriented x 3





Assessment /Plan


Assessment


Mr. Childers is an 80 year old male with metastatic carcinoma of unknown primary so

urce and chronic pulmonary embolism who was initially here for acute hypoxic 

respiratory failure and developed first shock, then atrial fibrillation with 

RVR. Pulmonary/Critical care following for the acute hypoxic respiratory failure

and shock, recommendations appreciated. Patient no longer requires Levophed. 

Patient was in atrial fibrillation with RVR, but converted with amiodarone. 

Cardiology following and recommendations appreciated. Otherwise, nephrology for 

acute renal failure. May need intermittent dialysis of renal function does not 

improve. Overall his prognosis is poor due to wide spread of metastasis and 

renal failure





Plan/VTE


VTE Prophylaxis Ordered?:  Yes





Plan


1. Toxic metabolic encephalopathy


-Multifactorial. Polypharmacy with oxycodone and uremia


-Was on CRRT due to Levophed, but now off of Levophed, may pursue intermittent 

dialysis if needed


-Supportive care





2. Acute hypoxic respiratory failure


-Possibly iatrogenic from fluid resuscitation in the setting of acute renal 

failure


-Nephrology and pulmonary following, recommendations appreciated. 


-Was on bipap, now on NC


-Respiratory panel was negative (COVID negative)





3. Acute renal failure


-May be secondary to Aleve


-On admission creatinine was 5.33


-Nephrology following, recommendations appreciated


-Off Levophed, may be able to pursue intermittent dialysis





4. Shock


-Unknown etiology


-On Cefotetan


-Off Levophed


-On trial period of Hydrocortisone





5. Atrial fibrillation with RVR


-Levophed and duonebs may have contributed to RVR


-Did not respond to digoxin


-Consulted Cardiology, Dr. Marcum/Marily


-On amiodarone





6. Hypothyroidism


-Continue on levothyroxine





7. DVT ppx


-Lovenox 1mg/kg qD





VS, I&O, 24H, [facility]


Vital Signs/I&O





Vital Signs








  Date Time  Temp Pulse Resp B/P (MAP) Pulse Ox O2 Delivery O2 Flow Rate FiO2


 


12/18/20 18:00  100 18 115/65 (82) 93 Nasal Cannula 5.0 


 


12/18/20 16:00 98.3       


 


12/15/20 12:00        100














I&O- Last 24 Hours up to 6 AM 


 


 12/18/20





 06:00


 


Intake Total 1475.2 ml


 


Output Total 1129 ml


 


Balance 346.2 ml











Laboratory Data


24H LABS


Laboratory Tests 2


12/18/20 05:49: 


Immature Granulocyte % (Auto) 0.7, Neutrophils (%) (Auto) 95.1H, Lymphocytes (%)

(Auto) 2.3L, Monocytes (%) (Auto) 1.7, Eosinophils (%) (Auto) 0.0, Basophils (%)

(Auto) 0.2, Neutrophils # (Auto) 18.9H, Lymphocytes # (Auto) 0.5L, Monocytes # 

(Auto) 0.3, Eosinophils # (Auto) 0.0, Basophils # (Auto) 0.0, Nucleated Red 

Blood Cells % (auto) 0.0, Prothrombin Time 14.8H, Prothromb Time International 

Ratio 1.13, Activated Partial Thromboplast Time 31.3, Anion Gap 3L, Glomerular 

Filtration Rate > 60.0, Calcium Level 8.4L, Whole Blood Ionized Calcium 4.6, 

Phosphorus Level 2.8#, Magnesium Level 2.0, Total Bilirubin 0.4, Aspartate Amino

Transf (AST/SGOT) 42H, Alanine Aminotransferase (ALT/SGPT) 17, Alkaline 

Phosphatase 100, Lactate Dehydrogenase 467H, Total Creatine Kinase 31L, Total 

Protein 5.3L, Albumin 1.6L, Albumin/Globulin Ratio 0.4, Triglycerides Level 

249H, Cholesterol Level 135


CBC/BMP


Laboratory Tests


12/18/20 05:49








Microbiology





Microbiology


12/18/20 Blood Culture, Received


           Pending


12/18/20 Blood Culture, Received


           Pending


12/14/20 Respiratory Virus Panel (PCR) ([facility]) - Final, Complete











MARTINA LOPEZ DO               Dec 18, 2020 20:09

## 2020-12-18 NOTE — IPN
CRITICAL CARE NOTE



DATE:  12/18/2020



Mr. Childers is seen this morning on his bedside in the intensive care unit. He

remains on continuous renal replacement therapy (CRRT) on his bedside. He is

still not making much urine. He is very weak and reports that he has not had

any appetite or bowel movement. His abdomen is quite distended. Nursing staff

reports that he was on Levophed again through the night; however, this morning

he is off Levophed now. He has also converted into sinus rhythm today. 



PHYSICAL EXAMINATION: Temperature 97.7 degrees Fahrenheit, heart rate about 85

per minute, and respiratory rate 18 per minute. Blood pressure 123/59 mm of

mercury, and oxygen saturation 90% on 4 liters oxygen. His head is atraumatic.

Neck is supple and jugular venous distention (JVD) not abnormally elevated.

Heart sounds are regular today and without a pericardial friction rub. Lungs

are with diminished breath sounds at bases. Abdomen is markedly distended and

mildly tender. Bowel sounds are hypoactive, if at all. Extremities without any

cyanosis or clubbing. Neurologically, he is awake and at his baseline mentation.



Today's labs show WBC count 19.8, hemoglobin 11.6, and hematocrit 35.2.

Platelets 268.



Sodium 137, potassium 4.2, CO2 of 30, BUN 7, and creatinine 1.10. Glucose 8.4,

calcium is 8.4, and phosphorus 2.8. Total protein 6.3 and albumin 1.6. 



PROBLEMS:

1. Oliguric renal failure. Patient remains oliguric and currently dialysis

dependent. He is still on CRRT.



2. Hypotension. His blood pressure is improved, and he is currently off the

pressors. We will continue our efforts to remove some fluid in order to improve

his volume status. 



3. Atrial fibrillation. Patient has already converted to sinus rhythm and

remains on amiodarone. 



4. Abdominal distention and leukocytosis. A CT scan of abdomen and pelvis is

being ordered with intravenous (IV) contrast due to his history of

carcinomatosis and now leukocytosis. 



5. Metastatic cancer with carcinomatosis. His overall prognosis remains poor,

as he has acute renal failure and not a candidate for any chemotherapy. At this

point, patient has wished to continue with acute care. I will recommend hospice

for him. 

28 minutes of critical care time spent on the bedside during which no procedures
were performed.

[facility]

## 2020-12-19 VITALS — SYSTOLIC BLOOD PRESSURE: 103 MMHG | DIASTOLIC BLOOD PRESSURE: 56 MMHG

## 2020-12-19 VITALS — DIASTOLIC BLOOD PRESSURE: 57 MMHG | SYSTOLIC BLOOD PRESSURE: 115 MMHG

## 2020-12-19 VITALS — SYSTOLIC BLOOD PRESSURE: 118 MMHG | DIASTOLIC BLOOD PRESSURE: 68 MMHG

## 2020-12-19 VITALS — SYSTOLIC BLOOD PRESSURE: 113 MMHG | DIASTOLIC BLOOD PRESSURE: 57 MMHG

## 2020-12-19 VITALS — SYSTOLIC BLOOD PRESSURE: 118 MMHG | DIASTOLIC BLOOD PRESSURE: 67 MMHG

## 2020-12-19 VITALS — DIASTOLIC BLOOD PRESSURE: 65 MMHG | SYSTOLIC BLOOD PRESSURE: 116 MMHG

## 2020-12-19 VITALS — SYSTOLIC BLOOD PRESSURE: 123 MMHG | DIASTOLIC BLOOD PRESSURE: 70 MMHG

## 2020-12-19 VITALS — DIASTOLIC BLOOD PRESSURE: 61 MMHG | SYSTOLIC BLOOD PRESSURE: 118 MMHG

## 2020-12-19 VITALS — SYSTOLIC BLOOD PRESSURE: 113 MMHG | DIASTOLIC BLOOD PRESSURE: 68 MMHG

## 2020-12-19 VITALS — DIASTOLIC BLOOD PRESSURE: 74 MMHG | SYSTOLIC BLOOD PRESSURE: 132 MMHG

## 2020-12-19 VITALS — SYSTOLIC BLOOD PRESSURE: 97 MMHG | DIASTOLIC BLOOD PRESSURE: 53 MMHG

## 2020-12-19 LAB
ALBUMIN SERPL BCG-MCNC: 1.5 GM/DL (ref 3.2–5.2)
ALT SERPL W P-5'-P-CCNC: 19 U/L (ref 12–78)
BASOPHILS # BLD AUTO: 0 10^3/UL (ref 0–0.2)
BASOPHILS NFR BLD AUTO: 0.1 % (ref 0–1)
BILIRUB SERPL-MCNC: 0.3 MG/DL (ref 0.2–1)
BUN SERPL-MCNC: 14 MG/DL (ref 7–18)
CALCIUM SERPL-MCNC: 8 MG/DL (ref 8.8–10.2)
CHLORIDE SERPL-SCNC: 103 MEQ/L (ref 98–107)
CHOLEST SERPL-MCNC: 130 MG/DL (ref ?–200)
CK SERPL-CCNC: 38 U/L (ref 39–308)
CO2 SERPL-SCNC: 26 MEQ/L (ref 21–32)
CREAT SERPL-MCNC: 2.13 MG/DL (ref 0.7–1.3)
EOSINOPHIL # BLD AUTO: 0 10^3/UL (ref 0–0.5)
EOSINOPHIL NFR BLD AUTO: 0 % (ref 0–3)
GFR SERPL CREATININE-BSD FRML MDRD: 31.8 ML/MIN/{1.73_M2} (ref 35–?)
GLUCOSE SERPL-MCNC: 84 MG/DL (ref 70–100)
HCT VFR BLD AUTO: 33 % (ref 42–52)
HGB BLD-MCNC: 10.4 G/DL (ref 13.5–17.5)
LDH SERPL L TO P-CCNC: 514 U/L (ref 87–241)
LYMPHOCYTES # BLD AUTO: 0.4 10^3/UL (ref 1.5–5)
LYMPHOCYTES NFR BLD AUTO: 2.4 % (ref 24–44)
MCH RBC QN AUTO: 28.2 PG (ref 27–33)
MCHC RBC AUTO-ENTMCNC: 31.5 G/DL (ref 32–36.5)
MCV RBC AUTO: 89.4 FL (ref 80–96)
MONOCYTES # BLD AUTO: 0.4 10^3/UL (ref 0–0.8)
MONOCYTES NFR BLD AUTO: 2.5 % (ref 0–5)
NEUTROPHILS # BLD AUTO: 14.5 10^3/UL (ref 1.5–8.5)
NEUTROPHILS NFR BLD AUTO: 94.2 % (ref 36–66)
PHOSPHATE SERPL-MCNC: 3.5 MG/DL (ref 2.5–4.9)
PLATELET # BLD AUTO: 268 10^3/UL (ref 150–450)
POTASSIUM SERPL-SCNC: 4.4 MEQ/L (ref 3.5–5.1)
PROT SERPL-MCNC: 5 GM/DL (ref 6.4–8.2)
RBC # BLD AUTO: 3.69 10^6/UL (ref 4.3–6.1)
SODIUM SERPL-SCNC: 137 MEQ/L (ref 136–145)
TRIGL SERPL-MCNC: 177 MG/DL (ref ?–150)
WBC # BLD AUTO: 15.4 10^3/UL (ref 4–10)

## 2020-12-19 RX ADMIN — MULTIPLE VITAMINS W/ MINERALS TAB SCH TAB: TAB at 09:34

## 2020-12-19 RX ADMIN — POLYETHYLENE GLYCOL 3350 SCH PKT: 17 POWDER, FOR SOLUTION ORAL at 09:00

## 2020-12-19 RX ADMIN — HYDROCORTISONE SODIUM SUCCINATE SCH MG: 100 INJECTION, POWDER, FOR SOLUTION INTRAMUSCULAR; INTRAVENOUS at 06:07

## 2020-12-19 RX ADMIN — OMEPRAZOLE SCH MG: 20 CAPSULE, DELAYED RELEASE ORAL at 20:17

## 2020-12-19 RX ADMIN — ENOXAPARIN SODIUM SCH MG: 80 INJECTION SUBCUTANEOUS at 20:17

## 2020-12-19 RX ADMIN — CEFOTETAN DISODIUM SCH MLS/HR: 1 INJECTION, POWDER, FOR SOLUTION INTRAMUSCULAR; INTRAVENOUS at 21:01

## 2020-12-19 RX ADMIN — HYDROCORTISONE SCH MG: 10 TABLET ORAL at 21:01

## 2020-12-19 RX ADMIN — LEVOTHYROXINE SODIUM SCH MCG: 50 TABLET ORAL at 06:07

## 2020-12-19 RX ADMIN — CEFOTETAN DISODIUM SCH MLS/HR: 1 INJECTION, POWDER, FOR SOLUTION INTRAMUSCULAR; INTRAVENOUS at 09:35

## 2020-12-19 RX ADMIN — HYDROCORTISONE SCH MG: 10 TABLET ORAL at 15:23

## 2020-12-19 RX ADMIN — SENNOSIDES SCH TAB: 8.6 TABLET, FILM COATED ORAL at 20:50

## 2020-12-19 RX ADMIN — AMIODARONE HYDROCHLORIDE SCH MG: 200 TABLET ORAL at 09:35

## 2020-12-19 RX ADMIN — OMEPRAZOLE SCH MG: 20 CAPSULE, DELAYED RELEASE ORAL at 09:34

## 2020-12-19 RX ADMIN — AMIODARONE HYDROCHLORIDE SCH MG: 200 TABLET ORAL at 20:17

## 2020-12-19 RX ADMIN — OXYCODONE HYDROCHLORIDE AND ACETAMINOPHEN SCH MG: 500 TABLET ORAL at 09:34

## 2020-12-19 RX ADMIN — SENNOSIDES SCH TAB: 8.6 TABLET, FILM COATED ORAL at 09:35

## 2020-12-19 NOTE — REP
INDICATION:

pulm edema



COMPARISON:

12/18/2020



TECHNIQUE:

Portable AP view of the chest



FINDINGS:

Left lower lobe pleuroparenchymal changes including areas of consolidation/partial

collapse and moderate pleural effusion are again noted and similar to prior

examination.  Subtle airspace disease involving the right hemithorax along with right

basilar atelectasis and small right pleural effusion are also suggested and similar to

prior examination.



Visualized portions of the mediastinum and cardiac silhouette are stable.  Right

subclavian catheter with tip in the SVC unchanged.  No pneumothorax.  Skeletal

structures appear stable.



IMPRESSION:

1.  Pleuroparenchymal changes primarily involving the left mid to lower lung zone and

right base again noted and similar to prior examination.





<Electronically signed by Teddy Ellis > 12/19/20 [id number]

## 2020-12-19 NOTE — IPNPDOC
Subjective


Date Seen


The patient was seen on 12/19/20.





Subjective


Chief Complaint/HPI


Mr. Childers is an 80 year old male with metastatic carcinoma of unknown primary 

source and chronic pulmonary embolism who was initially here for acute hypoxic 

respiratory failure and developed first shock, then atrial fibrillation with 

RVR. Yesterday, hospice has spoke with him. He wanted to make sure that his 

family was taken cared of before going to hospice. Otherwise, today denies 

fever, chest pain, dyspnea, or abdominal pain. He is not on CRRT or Levophed at 

this time.





Objective


Physical Examination


General Exam:  Positive: Alert, Cooperative


Eye Exam:  Positive: PERRLA, Conjunctiva & lids normal, Other Eye Symptoms 

(beginning to have a sunken appearance); 


   Negative: Sclera icteric


ENT Exam:  Positive: Atraumatic, Mucous membr. moist/pink, Pharynx Normal


Neck Exam:  Positive: Supple; 


   Negative: Lymphadenopathy (including supraclavicular)


Chest Exam:  Positive: Diminished


Heart Exam:  Positive: Rate Normal, Regular Rhythm


Abdomen Exam:  Positive: Other (Distended); 


   Negative: Normal bowel sounds, Soft, Tenderness


Extremity Exam:  Positive: Normal pulses; 


   Negative: Clubbing, Cyanosis, Edema


Skin Exam:  Positive: Nl turgor and temperature; 


   Negative: Breakdown, Lesion


Neuro Exam:  Positive: Normal Speech, Cranial Nerves 3-12 NL


Psych Exam:  Positive: Mental status NL, Mood NL, Oriented x 3





Assessment /Plan


Assessment


Mr. Childers is an 80 year old male with metastatic carcinoma of unknown primary 

source and chronic pulmonary embolism who was initially here for acute hypoxic 

respiratory failure and developed first shock, then atrial fibrillation with 

RVR. Pulmonary/Critical care following for the acute hypoxic respiratory failure

and shock, recommendations appreciated. Patient no longer requires Levophed. 

Patient was in atrial fibrillation with RVR, but converted with amiodarone. 

Cardiology following and recommendations appreciated. Otherwise, nephrology for 

acute renal failure. Urine output is poor and has not returned. Overall his 

prognosis is poor due to wide spread of metastasis and renal failure. Patient is

considering hospice once he knows his family will be taken cared of. Possible 

hospice on Monday





Plan/VTE


VTE Prophylaxis Ordered?:  Yes





Plan


1. Toxic metabolic encephalopathy


-Multifactorial. Polypharmacy with oxycodone and uremia


-Was on CRRT due to Levophed, but now off of Levophed, may pursue intermittent 

dialysis if needed/desired


-Supportive care





2. Acute hypoxic respiratory failure


-Possibly iatrogenic from fluid resuscitation in the setting of acute renal 

failure


-Nephrology and pulmonary following, recommendations appreciated. 


-Was on bipap, now on NC


-Respiratory panel was negative (COVID negative)





3. Oliguric renal failure


-May be secondary to Aleve


-On admission creatinine was 5.33


-Nephrology following, recommendations appreciated


-Off Levophed, may be able to pursue intermittent dialysis if desired


-Still poor urine output





4. Shock


-Unknown etiology


-On Cefotetan


-Off Levophed


-Will switch IV hydrocortisone to PO hydrocortisone





5. Atrial fibrillation with RVR


-Levophed and duonebs may have contributed to RVR


-Did not respond to digoxin


-Consulted Cardiology, Dr. Marcum/Marily


-On amiodarone


-Resolved





6. Hypothyroidism


-Continue on levothyroxine





7. DVT ppx


-Lovenox 1mg/kg qD





Disposition: Poor prognosis, patient aware. Planning for hospice when he knows 

family can be taken cared of. Possibly Monday





[facility]


Vital Signs/I&O





Vital Signs








  Date Time  Temp Pulse Resp B/P (MAP) Pulse Ox O2 Delivery O2 Flow Rate FiO2


 


12/19/20 10:00  99 18 118/68 (85) 94 Nasal Cannula 4.0 


 


12/19/20 04:00 98.3       


 


12/15/20 12:00        100














I&O- Last 24 Hours up to 6 AM 


 


 12/19/20





 06:00


 


Intake Total 620 ml


 


Output Total 681 ml


 


Balance -61 ml











Laboratory Data


24H LABS


Laboratory Tests 2


12/19/20 04:12: 


Immature Granulocyte % (Auto) 0.8, Neutrophils (%) (Auto) 94.2H, Lymphocytes (%)

(Auto) 2.4L, Monocytes (%) (Auto) 2.5, Eosinophils (%) (Auto) 0.0, Basophils (%)

(Auto) 0.1, Neutrophils # (Auto) 14.5H, Lymphocytes # (Auto) 0.4L, Monocytes # 

(Auto) 0.4, Eosinophils # (Auto) 0.0, Basophils # (Auto) 0.0, Nucleated Red 

Blood Cells % (auto) 0.0, Anion Gap 8, Glomerular Filtration Rate 31.8L, Calcium

Level 8.0L, Phosphorus Level 3.5#, Total Bilirubin 0.3, Aspartate Amino Transf 

(AST/SGOT) 45H, Alanine Aminotransferase (ALT/SGPT) 19, Alkaline Phosphatase 

102, Lactate Dehydrogenase 514H, Total Creatine Kinase 38L, Total Protein 5.0L, 

Albumin 1.5L, Albumin/Globulin Ratio 0.4, Triglycerides Level 177H, Cholesterol 

Level 130


CBC/BMP


Laboratory Tests


12/19/20 04:12








Microbiology





Microbiology


12/18/20 Blood Culture - Preliminary, Resulted


           No growth after 24 hours . All specim...


12/18/20 Blood Culture - Preliminary, Resulted


           No growth after 24 hours . All specim...


12/14/20 Respiratory Virus Panel (PCR) ([facility]) - Final, Complete











MARTINA LOPEZ DO               Dec 19, 2020 13:40

## 2020-12-20 VITALS — DIASTOLIC BLOOD PRESSURE: 60 MMHG | SYSTOLIC BLOOD PRESSURE: 125 MMHG

## 2020-12-20 VITALS — DIASTOLIC BLOOD PRESSURE: 53 MMHG | SYSTOLIC BLOOD PRESSURE: 102 MMHG

## 2020-12-20 VITALS — SYSTOLIC BLOOD PRESSURE: 90 MMHG | DIASTOLIC BLOOD PRESSURE: 54 MMHG

## 2020-12-20 VITALS — SYSTOLIC BLOOD PRESSURE: 122 MMHG | DIASTOLIC BLOOD PRESSURE: 66 MMHG

## 2020-12-20 LAB
ALBUMIN SERPL BCG-MCNC: 1.5 GM/DL (ref 3.2–5.2)
ALT SERPL W P-5'-P-CCNC: 19 U/L (ref 12–78)
BASOPHILS # BLD AUTO: 0 10^3/UL (ref 0–0.2)
BASOPHILS NFR BLD AUTO: 0.2 % (ref 0–1)
BILIRUB SERPL-MCNC: 0.2 MG/DL (ref 0.2–1)
BUN SERPL-MCNC: 22 MG/DL (ref 7–18)
CALCIUM SERPL-MCNC: 7.6 MG/DL (ref 8.8–10.2)
CHLORIDE SERPL-SCNC: 103 MEQ/L (ref 98–107)
CHOLEST SERPL-MCNC: 131 MG/DL (ref ?–200)
CK SERPL-CCNC: 33 U/L (ref 39–308)
CO2 SERPL-SCNC: 24 MEQ/L (ref 21–32)
CREAT SERPL-MCNC: 3.18 MG/DL (ref 0.7–1.3)
EOSINOPHIL # BLD AUTO: 0 10^3/UL (ref 0–0.5)
EOSINOPHIL NFR BLD AUTO: 0 % (ref 0–3)
GFR SERPL CREATININE-BSD FRML MDRD: 20 ML/MIN/{1.73_M2} (ref 35–?)
GLUCOSE SERPL-MCNC: 73 MG/DL (ref 70–100)
HCT VFR BLD AUTO: 31.3 % (ref 42–52)
HGB BLD-MCNC: 9.9 G/DL (ref 13.5–17.5)
LDH SERPL L TO P-CCNC: 492 U/L (ref 87–241)
LYMPHOCYTES # BLD AUTO: 0.5 10^3/UL (ref 1.5–5)
LYMPHOCYTES NFR BLD AUTO: 4 % (ref 24–44)
MCH RBC QN AUTO: 28.3 PG (ref 27–33)
MCHC RBC AUTO-ENTMCNC: 31.6 G/DL (ref 32–36.5)
MCV RBC AUTO: 89.4 FL (ref 80–96)
MONOCYTES # BLD AUTO: 0.6 10^3/UL (ref 0–0.8)
MONOCYTES NFR BLD AUTO: 4.7 % (ref 0–5)
NEUTROPHILS # BLD AUTO: 10.8 10^3/UL (ref 1.5–8.5)
NEUTROPHILS NFR BLD AUTO: 88.4 % (ref 36–66)
PHOSPHATE SERPL-MCNC: 4.1 MG/DL (ref 2.5–4.9)
PLATELET # BLD AUTO: 276 10^3/UL (ref 150–450)
POTASSIUM SERPL-SCNC: 4.4 MEQ/L (ref 3.5–5.1)
PROT SERPL-MCNC: 5 GM/DL (ref 6.4–8.2)
RBC # BLD AUTO: 3.5 10^6/UL (ref 4.3–6.1)
SODIUM SERPL-SCNC: 137 MEQ/L (ref 136–145)
TRIGL SERPL-MCNC: 186 MG/DL (ref ?–150)
WBC # BLD AUTO: 12.2 10^3/UL (ref 4–10)

## 2020-12-20 RX ADMIN — HYDROCORTISONE SCH MG: 10 TABLET ORAL at 05:37

## 2020-12-20 RX ADMIN — HYDROCORTISONE SCH MG: 10 TABLET ORAL at 21:00

## 2020-12-20 RX ADMIN — HYDROCORTISONE SCH MG: 10 TABLET ORAL at 16:52

## 2020-12-20 RX ADMIN — MORPHINE SULFATE PRN MG: 10 SOLUTION ORAL at 14:41

## 2020-12-20 RX ADMIN — LEVOTHYROXINE SODIUM SCH MCG: 50 TABLET ORAL at 05:37

## 2020-12-20 RX ADMIN — HYDROCORTISONE SCH MG: 10 TABLET ORAL at 16:00

## 2020-12-20 RX ADMIN — MORPHINE SULFATE PRN MG: 10 SOLUTION ORAL at 16:53

## 2020-12-20 NOTE — IPNPDOC
Subjective


Date Seen


The patient was seen on 12/20/20.





Subjective


Chief Complaint/HPI


Mr. Childers is an 80 year old male with metastatic carcinoma of unknown primary 

source and chronic pulmonary embolism who was initially here for acute hypoxic 

respiratory failure and developed first shock, then atrial fibrillation with 

RVR. Today, patient told me he wanted to go CMO and he wanted to go home. He was

made CMO with the understanding that he will die if we do not pursue life 

prolonging therapies. He wanted to be comfortable. I contacted the PFS on call 

to see if we could help him home. I also spoke with the patient's wife and PCP 

(Dr. Blackmon). Patient's wife would like him home, but does not feel comfortable 

without hospice help, especially with end of life care. I told her he may pass 

away at home and she was frightened. I spoke with Dr. Blackmon who also agreed 

that he should stay until Monday when we can contact hospice. When I returned to

speak with the patient, he was sleeping. He just had medications to control pain

and calm anxiety.





Objective


Physical Examination


General Exam:  Positive: Alert, Cooperative


Eye Exam:  Positive: PERRLA, Conjunctiva & lids normal, Other Eye Symptoms 

(beginning to have a sunken appearance); 


   Negative: Sclera icteric


ENT Exam:  Positive: Atraumatic, Mucous membr. moist/pink, Pharynx Normal


Neck Exam:  Positive: Supple


Chest Exam:  Positive: Diminished


Heart Exam:  Positive: Rate Normal, Regular Rhythm


Abdomen Exam:  Positive: Other (Distended); 


   Negative: Soft, Tenderness


Extremity Exam:  Positive: Normal pulses; 


   Negative: Clubbing, Cyanosis, Edema


Skin Exam:  Positive: Nl turgor and temperature; 


   Negative: Breakdown, Lesion


Neuro Exam:  Positive: Normal Speech, Cranial Nerves 3-12 NL


Psych Exam:  Positive: Anxiety, Oriented x 3





Assessment /Plan


Assessment


Mr. Childers is an 80 year old male with metastatic carcinoma of unknown primary 

source and chronic pulmonary embolism who was initially here for acute hypoxic 

respiratory failure and developed first shock, then atrial fibrillation with 

RVR. Both the shock and afib with RVR resolved, but his kidney failure did not 

improve. Today, he decided to go CMO. Plan to go home tomorrow with hospice





Plan/VTE


VTE Prophylaxis Ordered?:  Yes





Plan


1. Toxic metabolic encephalopathy


2. Acute hypoxic respiratory failure


3. Oliguric renal failure


4. Shock


5. Atrial fibrillation with RVR


6. Hypothyroidism





Disposition: Poor prognosis, patient aware. Plan to go home tomorrow with 

hospice





VS, I&O, 24H, [place]


Vital Signs/I&O





Vital Signs








  Date Time  Temp Pulse Resp B/P (MAP) Pulse Ox O2 Delivery O2 Flow Rate FiO2


 


12/20/20 12:00       4.0 


 


12/20/20 09:56   20   High Flow Cannula  


 


12/20/20 08:00 99.5 81  102/53 (69) 96   


 


12/15/20 12:00        100














I&O- Last 24 Hours up to 6 AM 


 


 12/20/20





 06:00


 


Intake Total 590 ml


 


Output Total 70 ml


 


Balance 520 ml











Laboratory Data


24H LABS


Laboratory Tests 2


12/20/20 05:46: 


Immature Granulocyte % (Auto) 2.7, Neutrophils (%) (Auto) 88.4H, Lymphocytes (%)

(Auto) 4.0L, Monocytes (%) (Auto) 4.7, Eosinophils (%) (Auto) 0.0, Basophils (%)

(Auto) 0.2, Neutrophils # (Auto) 10.8H, Lymphocytes # (Auto) 0.5L, Monocytes # 

(Auto) 0.6, Eosinophils # (Auto) 0.0, Basophils # (Auto) 0.0, Nucleated Red 

Blood Cells % (auto) 0.0, Anion Gap 10, Glomerular Filtration Rate 20.0L, 

Calcium Level 7.6L, Phosphorus Level 4.1, Total Bilirubin 0.2, Aspartate Amino 

Transf (AST/SGOT) 42H, Alanine Aminotransferase (ALT/SGPT) 19, Alkaline 

Phosphatase 81, Lactate Dehydrogenase 492H, Total Creatine Kinase 33L, Total 

Protein 5.0L, Albumin 1.5L, Albumin/Globulin Ratio 0.4, Triglycerides Level 

186H, Cholesterol Level 131


CBC/BMP


Laboratory Tests


12/20/20 05:46








Microbiology





Microbiology


12/18/20 Blood Culture - Preliminary, Resulted


           No Growth after 48 hours. All Specime...


12/18/20 Blood Culture - Preliminary, Resulted


           No Growth after 48 hours. All Specime...


12/14/20 Respiratory Virus Panel (PCR) ([facility]) - Final, Complete











MARTINA LOPEZ DO               Dec 20, 2020 14:36

## 2020-12-20 NOTE — IPN
NEPHROLOGY PROGRESS NOTE



DATE:  12/19/2020



SUBJECTIVE:  The patient was seen and examined at the bedside today morning.

Last 24 hour events were noted. His CVVHD was stopped yesterday. The patient

remains oliguric. He remains weak and cachectic. He was visibly short of

breath. The patient was talking to his  and family members when I saw him

today morning and he is planning to make his living will and trying to arrange

his finances and the patient is thinking about making himself comfort measures

only and planning to go to Hospice.  He does not want to do any more

hemodialysis from now on.



OBJECTIVE:  

VITAL SIGNS: Temperature is 97.8 degrees Fahrenheit, blood pressure 103/56,

pulse is 92, respiratory rate of 16, saturating 94% on nasal cannula at 4

liters. 

INTAKE AND OUTPUT: Urine output recorded as 88 mL since overnight. Fluid

removal with CVVDH yesterday was 596 mL. Weight on the bed scale is 80.2 kg. 



PHYSICAL EXAMINATION:  

GENERAL APPEARANCE: The patient is awake, alert, oriented x3, weak and

cachectic, malnourished with shortness of breath.  

HEAD AND NECK:  Extraocular muscles intact. Pupils are equally round and

reactive to light. Mucous membranes are moist. Neck is supple. He has no

jugular venous distention. 

CARDIOVASCULAR: S1, S2, regular rate.

EXTREMITIES:  2+ edema of the bilateral lower extremities. 

RESPIRATORY: Decreased breath sounds at the bases, otherwise no active rales or

rhonchi. He has a right subclavian triple lumen catheter. 

ABDOMEN: Distended, moderately tender to deep palpation all over. It is hard

and has some guarding. 

GENITOURINARY: He has an indwelling Lemons catheter; very small amount of urine

in the bag was noted.

MUSCULOSKELETAL: 2+ edema of the bilateral lower extremities. 

CNS: No focal deficits. Power is 5/5 in all extremities.  



LAB REVIEW:  CBC showed a WBC of 15.4, hemoglobin 10.4, platelets are 268. 



BMP showed sodium of 137, potassium 4.4, chloride 103, bicarbonate 26, BUN 14,

creatinine is 2.1. Calcium is 8. AST 45, ALT 19, alkaline phosphatase is 102.

LDH is 514. 



CURRENT INPATIENT MEDICATIONS: The patient's medications were all reviewed by

myself. He is currently not requiring any pressors at this time. He continues

to be on IV Cefotetan and oral Amiodarone. Hydrocortisone has been changed to

50 mg p.o. q. 8 hourly. No other significant change in the medications today as

compared with yesterday. 



ASSESSMENT AND PLAN:  

1.  Acute oliguric renal failure - The patient remains oliguric. CVVHD was

    stopped yesterday. The patient plans to go to Hospice.  He does not want to

    continue any more renal replacement therapy. Continue the medical management

    only. 

2.  Hypoxic respiratory failure  the patient is still requiring nasal cannula

    at 4 liters and hypoxemic. No further fluid removal at this time. CVVHDF and

    dialysis have been stopped. 

3.  Atrial fibrillation - heart rate is controlled now. He continues to be on

    Amiodarone. 

4.  Metastatic cancer with peritoneal carcinomatosis - The patient overall has

    poor prognosis. He is not a candidate for chemotherapy and he is planning

    to go to Hospice after the weekend.

5.  Disposition - The patient has a poor prognosis. He is going to Hospice. 

    Nephrology Service is going to sign off at this moment. Please call

    Nephrology Service for any help in the management of this patient during

    this hospitalization.

## 2020-12-20 NOTE — REP
INDICATION:

pulm edema



COMPARISON:

12/19/2020, 12/18/2020



TECHNIQUE:

Portable AP view of the chest



FINDINGS:

Left mid to lower lobe opacities consistent with airspace disease and moderate pleural

effusion along with right perihilar and right basilar opacity suggesting atelectasis

and small effusion again noted and similar to prior examination.  No new acute

process.  No pneumothorax.



IMPRESSION:

Bilateral airspace disease and pleural effusions (left greater than right) similar to

prior examination.





<Electronically signed by Teddy Ellis > 12/20/20 0866

## 2020-12-21 RX ADMIN — MORPHINE SULFATE PRN MG: 10 SOLUTION ORAL at 06:29

## 2020-12-21 RX ADMIN — Medication SCH MLS/HR: at 12:41

## 2020-12-21 RX ADMIN — HYDROCORTISONE SCH MG: 10 TABLET ORAL at 16:00

## 2020-12-21 RX ADMIN — HYDROCORTISONE SCH MG: 10 TABLET ORAL at 20:02

## 2020-12-21 RX ADMIN — MORPHINE SULFATE PRN MG: 10 SOLUTION ORAL at 01:50

## 2020-12-21 RX ADMIN — HYDROCORTISONE SCH MG: 10 TABLET ORAL at 08:15

## 2020-12-21 RX ADMIN — MORPHINE SULFATE PRN MG: 10 SOLUTION ORAL at 03:59

## 2020-12-21 NOTE — IPNPDOC
Subjective


Date Seen


The patient was seen on 12/21/20.





Subjective


Chief Complaint/HPI


Mr. Childers is an 80 year old male with metastatic carcinoma of unknown primary 

source and chronic pulmonary embolism who was initially here for acute hypoxic 

respiratory failure and developed first shock, then atrial fibrillation with 

RVR. He is now CMO. Overnight, he was in pain. This morning, he looked weak and 

had mottling. Wife spoke with PFS, she said she would not be able to care for 

him at home. Since home was not an option, patient was started on a morphine 

drip to control the pain. Wife and daughter was allow to visit.





Objective


Physical Examination


General Exam:  Positive: Cooperative


Eye Exam:  Negative: Sclera icteric


ENT Exam:  Negative: Mucous membr. moist/pink (dry)


Neuro Exam:  Positive: Normal Speech


Psych Exam:  Positive: Anxiety





Assessment /Plan


Assessment


Mr. Childers is an 80 year old male with metastatic carcinoma of unknown primary 

source and chronic pulmonary embolism who was initially here for acute hypoxic 

respiratory failure and developed first shock, then atrial fibrillation with 

RVR. Both the shock and afib with RVR resolved, but his kidney failure did not 

improve. On 12/20/2020, he decided to go CMO. Wife unable to care for him at 

home. Wife and daughter visited him in the hospital today





Plan/VTE


VTE Prophylaxis Ordered?:  Yes





Plan


1. Toxic metabolic encephalopathy


2. Acute hypoxic respiratory failure


3. Oliguric renal failure requiring dialysis


4. Shock


5. Atrial fibrillation with RVR


6. Hypothyroidism





Disposition: Poor prognosis, patient aware. CMO. May pass in the next few days 

or sooner. Family was allowed to see him today





VS, I&O, 24H, [place]


Vital Signs/I&O





Vital Signs








  Date Time  Temp Pulse Resp B/P (MAP) Pulse Ox O2 Delivery O2 Flow Rate FiO2


 


12/21/20 09:00       2.0 


 


12/20/20 09:56   20   High Flow Cannula  


 


12/20/20 08:00 99.5 81  102/53 (69) 96   


 


12/15/20 12:00        100














I&O- Last 24 Hours up to 6 AM 


 


 12/21/20





 06:00


 


Intake Total 350 ml


 


Output Total 0 ml


 


Balance 350 ml











Laboratory Data


Microbiology





Microbiology


12/18/20 Blood Culture - Preliminary, Resulted


           No Growth after 72 hours. All specime...


12/18/20 Blood Culture - Preliminary, Resulted


           No Growth after 72 hours. All specime...


12/14/20 Respiratory Virus Panel (PCR) ([facility]) - Final, Complete











MARTINA LOPEZ DO               Dec 21, 2020 16:50

## 2020-12-22 RX ADMIN — Medication SCH MLS/HR: at 10:43

## 2020-12-22 RX ADMIN — HYDROCORTISONE SCH MG: 10 TABLET ORAL at 15:43

## 2020-12-22 RX ADMIN — HYDROCORTISONE SCH MG: 10 TABLET ORAL at 21:00

## 2020-12-22 RX ADMIN — HYDROCORTISONE SCH MG: 10 TABLET ORAL at 09:00

## 2020-12-22 NOTE — IPNPDOC
Text Note


Date of Service


The patient was seen on 20.





NOTE


Subjective:


-No acute events overnight





Objective:


General: NAD, pale, sleeping, chronically ill appearing


Eyes: EOMI, anicteric, no injection


ENT: dry MM


Cardiac: RRR, no mrg


Abd: scaphoid, soft, hypoactive, NTND


Psych: AOx3, sleeping, responds to voice, appropriate.





Labs: None, CMO





Assessment:


80 year old M with metastatic carcinoma of unknown primary source and chronic 

pulmonary embolism who was initially here for acute hypoxemic respiratory 

failure and developed first shock, then atrial fibrillation with RVR that have 

resolved, as well as kidney failure that did not improve and on 2020 

decided to become CMO and wife is unable to care for him at home with ongoing 

planning for end of life care.





Plan:


CMO without aggressive interventions for the followin. Toxic metabolic encephalopathy


2. Acute hypoxic respiratory failure (on 2L NC)


3. Oliguric renal failure requiring dialysis (off dialysis now)


4. Shock (resolved)


5. Atrial fibrillation with RVR (resolved)


6. Hypothyroidism





Disposition: Poor prognosis, patient aware. CMO. May pass in the next few days 

or sooner.





VS,Fishbone, I+O


VS, Fishbone, I+O





Vital Signs








  Date Time  Temp Pulse Resp B/P (MAP) Pulse Ox O2 Delivery O2 Flow Rate FiO2


 


20 05:43       2.0 


 


20 09:56   20   High Flow Cannula  


 


20 08:00 99.5 81  102/53 (90) 96   














I&O- Last 24 Hours up to 6 AM 


 


 20





 06:00


 


Intake Total 0 ml


 


Output Total 300 ml


 


Balance -300 ml

















ANABELLE ARNETT MD   Dec 22, 2020 09:21

## 2020-12-24 NOTE — DS.PDOC
Discharge Summary


General


Date of Admission


Dec 14, 2020 at 06:37


Date of Discharge


2020


Attending Physician:  ANABELLE ARNETT MD





Discharge Summary


PROCEDURES PERFORMED DURING STAY: None





ADMITTING DIAGNOSES: 


1. MARCUS





DISCHARGE DIAGNOSES:


Anuric acute renal failure


Metastatic cancer of unclear primary etiology


Metabolic encephalopathy


Acute hypoxic respiratory failure


Shock


Ne onset atrial fibrillation with RVR


Hypothyroidism





COMPLICATIONS/CHIEF COMPLAINT: Acute Renal Failure,Auditory Hallucinations.





HISTORY OF PRESENT ILLNESS: 


80 year old M with metastatic carcinoma of unknown primary source and chronic 

pulmonary embolism who presented with SOB and was found to have acute hypoxic 

respiratory failure and MARCUS and admitted to medicine.





HOSPITAL COURSE:


While inpatient he developed first shock, then atrial fibrillation with RVR and 

Marcus worsened and became anuric. Both the shock and afib with RVR eventually 

resolved with medical management but his kidney failure did not improve. On 

2020, he decided to become comfort measures only and on  he . 





DISCHARGE MEDICATIONS: Please see below.


 


ALLERGIES: Please see below.





PHYSICAL EXAMINATION ON DISCHARGE:


VITAL SIGNS: Please see below.


I WAS NOT PRESENT AT TIME OF DISCHARGE AS HE  OVERNIGHT.





LABORATORY DATA: Please see below.





IMAGING: Reviewed. Refer to chart.





PROGNOSIS: 





ACTIVITY: 





DIET: 





DISCHARGE PLAN: 





DISPOSITION: 20 .





DISCHARGE INSTRUCTIONS:








ITEMS TO FOLLOWUP ON ON OUTPATIENT:








DISCHARGE CONDITION: 





TIME SPENT ON DISCHARGE: 30 minutes.





Vital Signs/I&Os





Vital Signs








  Date Time  Temp Pulse Resp B/P (MAP) Pulse Ox O2 Delivery O2 Flow Rate FiO2


 


20 09:00       2.0 


 


20 09:56   20   High Flow Cannula  


 


20 08:00 99.5 81  102/53 (69) 96   











Microbiology





Microbiology


20 Blood Culture - Final, Complete


           NO GROWTH AFTER 5 DAYS


20 Blood Culture - Final, Complete


           NO GROWTH AFTER 5 DAYS


20 Respiratory Virus Panel (PCR) ([facility]) - Final, Complete





Discharge Medications


Scheduled


Apixaban (Eliquis) 5 Mg Tablet, 5 MG PO BID, (Reported)


Ascorbic Acid (Ascorbic Acid) 500 Mg Tablet, 1,000 MG PO DAILY, (Reported)


Furosemide (Furosemide) 40 Mg Tablet, 40 MG PO DAILY, (Reported)


Multivitamins (Thera M Plus Tablet) 1 Each Tablet, 1 TAB PO DAILY, (Reported)


Omeprazole (Omeprazole) 40 Mg Capsule.dr, 40 MG PO BID, (Reported)


Polyethylene Glycol 3350 (Polyethylene Glycol 3350) 510 Gm Powder, 17 GRAM PO 

DAILY, (Reported)





Scheduled PRN


Ondansetron HCl (Ondansetron HCl) 4 Mg Tablet, 4 MG PO BID PRN for NAUSEA OR 

VOMITING, (Reported)


Oxycodone HCl (Oxycodone HCl) 5 Mg Tablet, 5 MG PO QID PRN for PAIN, (Reported)





Allergies


Coded Allergies:  


     No Known Allergies (Unverified , 20)











ANABELLE ARNETT MD   Dec 24, 2020 09:45

## 2021-07-05 NOTE — DS.PDOC
Discharge Summary


General


Date of Admission


Nov 19, 2020 at 20:30


Date of Discharge


11/23/20





Discharge Summary


PROCEDURES PERFORMED DURING STAY: Laparoscopy with biopsy of peritoneal tumor 

nodules. 





DISCHARGE DIAGNOSES:


Partial SBO


Metastatic cancer in the abdomen from unknown primary


Malignant Ascites with extensive carcinomatosis


Chronic Pulmonary Embolism, right posterior basilar segment, partially occlusive


Hypertensive


Duodenal ulcer and erosive gastropathy


Esophageal stenosis 


Moderate to large Chronic Left pleural effusion asymptomatic. 





COMPLICATIONS/CHIEF COMPLAINT: Metastatic Cancer, Partial Small Bowel 

Obstruction.





HOSPITAL COURSE: Patient is an 80-year-old male, past medical history 

significant for recently diagnosed metastatic disease of unknown primary source 

with omental caking and mesenteric deposits with Ascites. He presented to the 

emergency department the evening of 11/19/24 increasing abdominal pain and 

discomfort. Earlier in the week, patient underwent paracentesis with removal of 

over 5000 mL of fluid. He had an EGD also earlier this which showed benign 

esophageal stricture, Had Colonoscopy in 2019 had multiple tubular adenomas.  

Imaging in the emergency department demonstrated a possible small bowel 

obstruction vs Ileus. Surgery was contacted who recommended refraining from NG 

tube placement, keeping the patient nothing by mouth with adequate IVF. CT angio

of chest showed chronic right basilar Pulmonary Embolism and left-sided pleural 

effusion moderate. Effusion, ascites and PE are most likely related to patient's

underlying malignancy. Given these findings, in the setting of metastatic 

disease, patient was admitted to the hospitalist service for further evaluation 

and management.





Partial SBO 


seems to have resolved at this time


Diet as tolerated. 





Chronic Pulmonary Embolism, right posterior basilar segment, partially 

occlusive, 


started on eliquis. 





Metastatic cancer with Omental/Peritoneal Mets, unknown primary


There were malignant cells in the ascites however immunohistochemistry could not

make a definitive diagnosis from the cells due to the paucity of the cells


Discussed with Dr Mittal will set up appointment in 1 week. 


PSA and Ca 19-9 normal. CEA only 5.1


May need mesenteric lymph node / omental bx to get a tissue diagnosis. 


S/p Laparoscopic Biopsy





Hypertension


Lisinopril reduced dose


will stop HCTZ. 





Esophageal stenosis 


S/P endoscopic evaluation on 11/17 by Dr. Quezada. 


Biopsy did not show any malignancy


Continue PPI





Duodenal ulcer and erosive gastropathy seen in EGD on 11/17/20


Duodenal mucosa with extensive ulceration and reparative changes.


Negative for malignancy.


PPI





Left Pleural effusion


asymptomatic. 





DISCHARGE MEDICATIONS: Please see below.


 


ALLERGIES: Please see below.





PHYSICAL EXAMINATION ON DISCHARGE:


VITAL SIGNS: Please see below.


GENERAL APPEARANCE: Laying flat in bed awake, alert, no acute distress. 


HEENT: Normocephalic, atraumatic, symmetric, EOMI, PERRLA, mucous membranes 

moist.


CARDIOVASCULAR: Regular rate and rhythm without appreciable murmur/ rub or 

gallop


LUNGS: Lung sounds diminished in the bases bilaterally, more so on the left than

the right. Left-sided basilar crackles. Otherwise good air movement with symmet

jun chest rise. No appreciable wheezing or rhonchi


ABDOMEN: Distended, round, mild tenderness in the lower quadrants bilaterally, 

good bowel sounds appreciated. No overlying skin changes


MUSCULOSKELETAL: Patient is able to move all extremities equally bilaterally


EXTREMITIES: No edema No calf tenderness


NEUROLOGICAL: Strength 5 out of 5 in upper and lower extremities. No facial jonna

op, aphasia or dysarthria. Sensorium intact


PSYCHIATRIC: Mood and affect are appropriate given patient's current medical 

conditions.





LABORATORY DATA: Please see below.





ACTIVITY: [As tolerated].





DIET: As tolerated.





DISPOSITION: 01 Home, Self-Care.





DISCHARGE INSTRUCTIONS:


Follow up With Dr Quezada in 1 week


Follow up Dr Mittal. 





ITEMS TO FOLLOWUP ON ON OUTPATIENT:


Biopsy results





DISCHARGE CONDITION: [Stable].





TIME SPENT ON DISCHARGE: 35 minutes.





Vital Signs/I&Os





Vital Signs








  Date Time  Temp Pulse Resp B/P (MAP) Pulse Ox O2 Delivery O2 Flow Rate FiO2


 


11/23/20 14:00 98.7 99 20 103/61 (75) 93 Room Air  


 


11/23/20 10:00       0.5 











Discharge Medications


Scheduled


Apixaban (Eliquis) 5 Mg Tablet, 1 TAB PO BID


Ascorbic Acid (Ascorbic Acid) 500 Mg Tablet, 1,000 MG PO DAILY, (Reported)


Cholecalciferol (Vitamin D3) (Vitamin D3) 1,000 Unit Tablet, 1,000 UNITS PO 

DAILY, (Reported)


Multivitamins (Thera M Plus Tablet) 1 Each Tablet, 1 TAB PO DAILY, (Reported)


Omeprazole (Omeprazole) 40 Mg Capsule.dr, 40 MG PO BID, (Reported)


Ondansetron HCl (Zofran) 4 Mg Tablet, 1 TAB PO Q6H





Scheduled PRN


Acetaminophen (Acetaminophen ER) 650 Mg Tablet.er, 650 MG PO Q8H PRN for PAIN, 

(Reported)


Hydrocodone/Acetaminophen (Norco 5-325 Tablet) 1 Each Tablet, 1-2 TAB PO Q4-6HP 

PRN for pain





Allergies


Coded Allergies:  


     No Known Allergies (Unverified , 11/12/20)











FLORIAN ROBLES MD                   Nov 28, 2020 09:36 [employer]